# Patient Record
Sex: FEMALE | Race: WHITE | Employment: OTHER | URBAN - METROPOLITAN AREA
[De-identification: names, ages, dates, MRNs, and addresses within clinical notes are randomized per-mention and may not be internally consistent; named-entity substitution may affect disease eponyms.]

---

## 2018-03-21 ENCOUNTER — HOSPITAL ENCOUNTER (OUTPATIENT)
Dept: GENERAL RADIOLOGY | Age: 68
Discharge: HOME | End: 2018-03-23
Payer: MEDICARE

## 2018-03-21 ENCOUNTER — HOSPITAL ENCOUNTER (OUTPATIENT)
Age: 68
End: 2018-03-21
Payer: MEDICARE

## 2018-03-21 DIAGNOSIS — M25.541 ARTHRALGIA OF METACARPOPHALANGEAL JOINT, RIGHT: ICD-10-CM

## 2018-03-21 PROCEDURE — 73130 X-RAY EXAM OF HAND: CPT

## 2018-06-12 ENCOUNTER — HOSPITAL ENCOUNTER (OUTPATIENT)
Dept: GENERAL RADIOLOGY | Age: 68
Discharge: HOME | End: 2018-06-14
Payer: COMMERCIAL

## 2018-06-12 ENCOUNTER — HOSPITAL ENCOUNTER (OUTPATIENT)
Age: 68
Discharge: HOME | End: 2018-06-14
Payer: COMMERCIAL

## 2018-06-12 DIAGNOSIS — S33.9XXA SPRAIN OF LIGAMENT OF LUMBOSACRAL JOINT, INITIAL ENCOUNTER: ICD-10-CM

## 2018-06-12 DIAGNOSIS — S13.4XXA SPRAIN OF LIGAMENTS OF CERVICAL SPINE, INITIAL ENCOUNTER: ICD-10-CM

## 2018-06-12 PROCEDURE — 72110 X-RAY EXAM L-2 SPINE 4/>VWS: CPT

## 2018-06-12 PROCEDURE — 72050 X-RAY EXAM NECK SPINE 4/5VWS: CPT

## 2018-08-15 ENCOUNTER — APPOINTMENT (OUTPATIENT)
Dept: GENERAL RADIOLOGY | Age: 68
End: 2018-08-15
Payer: MEDICARE

## 2018-08-15 ENCOUNTER — HOSPITAL ENCOUNTER (EMERGENCY)
Age: 68
Discharge: HOME | End: 2018-08-15
Payer: MEDICARE

## 2018-08-15 VITALS
HEART RATE: 90 BPM | TEMPERATURE: 98 F | OXYGEN SATURATION: 96 % | SYSTOLIC BLOOD PRESSURE: 113 MMHG | DIASTOLIC BLOOD PRESSURE: 62 MMHG | RESPIRATION RATE: 16 BRPM | BODY MASS INDEX: 25.4 KG/M2 | HEIGHT: 62 IN | WEIGHT: 138 LBS

## 2018-08-15 DIAGNOSIS — S92.421B OPEN DISPLACED FRACTURE OF DISTAL PHALANX OF RIGHT GREAT TOE, INITIAL ENCOUNTER: Primary | ICD-10-CM

## 2018-08-15 PROCEDURE — 6370000000 HC RX 637 (ALT 250 FOR IP): Performed by: PHYSICIAN ASSISTANT

## 2018-08-15 PROCEDURE — 90715 TDAP VACCINE 7 YRS/> IM: CPT | Performed by: PHYSICIAN ASSISTANT

## 2018-08-15 PROCEDURE — 90471 IMMUNIZATION ADMIN: CPT | Performed by: PHYSICIAN ASSISTANT

## 2018-08-15 PROCEDURE — 96374 THER/PROPH/DIAG INJ IV PUSH: CPT

## 2018-08-15 PROCEDURE — 12002 RPR S/N/AX/GEN/TRNK2.6-7.5CM: CPT

## 2018-08-15 PROCEDURE — 2500000003 HC RX 250 WO HCPCS

## 2018-08-15 PROCEDURE — 6360000002 HC RX W HCPCS: Performed by: PHYSICIAN ASSISTANT

## 2018-08-15 PROCEDURE — 73660 X-RAY EXAM OF TOE(S): CPT

## 2018-08-15 PROCEDURE — 2580000003 HC RX 258: Performed by: PHYSICIAN ASSISTANT

## 2018-08-15 PROCEDURE — 99283 EMERGENCY DEPT VISIT LOW MDM: CPT

## 2018-08-15 RX ORDER — HYDROCODONE BITARTRATE AND ACETAMINOPHEN 5; 325 MG/1; MG/1
1 TABLET ORAL EVERY 6 HOURS PRN
Qty: 12 TABLET | Refills: 0 | Status: SHIPPED | OUTPATIENT
Start: 2018-08-15 | End: 2018-08-18

## 2018-08-15 RX ORDER — LEVOTHYROXINE SODIUM 0.12 MG/1
125 TABLET ORAL DAILY
Status: ON HOLD | COMMUNITY
End: 2021-07-20 | Stop reason: SDUPTHER

## 2018-08-15 RX ORDER — HYDROCODONE BITARTRATE AND ACETAMINOPHEN 5; 325 MG/1; MG/1
1 TABLET ORAL ONCE
Status: COMPLETED | OUTPATIENT
Start: 2018-08-15 | End: 2018-08-15

## 2018-08-15 RX ORDER — CEPHALEXIN 500 MG/1
500 CAPSULE ORAL 3 TIMES DAILY
Qty: 15 CAPSULE | Refills: 0 | Status: SHIPPED | OUTPATIENT
Start: 2018-08-15 | End: 2018-08-20

## 2018-08-15 RX ORDER — MINOXIDIL 10 MG/1
10 TABLET ORAL DAILY
Status: ON HOLD | COMMUNITY
End: 2021-07-20 | Stop reason: HOSPADM

## 2018-08-15 RX ORDER — DIAPER,BRIEF,INFANT-TODD,DISP
EACH MISCELLANEOUS ONCE
Status: COMPLETED | OUTPATIENT
Start: 2018-08-15 | End: 2018-08-15

## 2018-08-15 RX ORDER — LIDOCAINE HYDROCHLORIDE 10 MG/ML
INJECTION, SOLUTION INFILTRATION; PERINEURAL
Status: COMPLETED
Start: 2018-08-15 | End: 2018-08-15

## 2018-08-15 RX ADMIN — CEFAZOLIN 2 G: 1 INJECTION, POWDER, FOR SOLUTION INTRAMUSCULAR; INTRAVENOUS at 14:00

## 2018-08-15 RX ADMIN — TETANUS TOXOID, REDUCED DIPHTHERIA TOXOID AND ACELLULAR PERTUSSIS VACCINE, ADSORBED 0.5 ML: 5; 2.5; 8; 8; 2.5 SUSPENSION INTRAMUSCULAR at 12:55

## 2018-08-15 RX ADMIN — BACITRACIN: 500 OINTMENT TOPICAL at 12:55

## 2018-08-15 RX ADMIN — LIDOCAINE HYDROCHLORIDE 200 MG: 10 INJECTION, SOLUTION INFILTRATION; PERINEURAL at 12:18

## 2018-08-15 RX ADMIN — HYDROCODONE BITARTRATE AND ACETAMINOPHEN 1 TABLET: 5; 325 TABLET ORAL at 12:54

## 2018-08-15 ASSESSMENT — PAIN SCALES - GENERAL
PAINLEVEL_OUTOF10: 4
PAINLEVEL_OUTOF10: 10
PAINLEVEL_OUTOF10: 5
PAINLEVEL_OUTOF10: 10

## 2018-08-15 ASSESSMENT — PAIN DESCRIPTION - ORIENTATION: ORIENTATION: RIGHT

## 2018-08-15 ASSESSMENT — PAIN DESCRIPTION - LOCATION: LOCATION: TOE (COMMENT WHICH ONE)

## 2018-08-15 ASSESSMENT — PAIN DESCRIPTION - DESCRIPTORS: DESCRIPTORS: ACHING;THROBBING

## 2018-08-15 ASSESSMENT — PAIN DESCRIPTION - FREQUENCY: FREQUENCY: CONTINUOUS

## 2018-08-15 ASSESSMENT — PAIN DESCRIPTION - PAIN TYPE: TYPE: ACUTE PAIN

## 2018-08-15 NOTE — ED NOTES
Reviewed discharge instructions with patient, discussed medications and addressed all patient and family questions/concerns. Pt verbalizes understanding.      Jovana Cole RN  08/15/18 [id number]

## 2018-08-15 NOTE — ED PROVIDER NOTES
[facility]       Department of Emergency Medicine   ED  Provider Note  Admit Date/RoomTime: 8/15/2018 12:01 PM  ED Room: [facility]  Chief Complaint   Toe Injury (reports dropping generator on right foot, right first toe injury. Injury occurred at 1100am. Laceration x2 to left great toe. Actively bleeding on arrival)    History of Present Illness   Source of history provided by:  patient. History/Exam Limitations: none. Cindy Medina is a 75 y.o. old female presenting to the emergency department by private vehicle, for right, great toe pain which occured 1.5 hour(s) prior to arrival. Cause of complaint: the patient dropped a generator on her foot. Since onset the symptoms have been severe in degree. Her pain is aggraveated by movement, use and palpation and relieved by nothing. Tetanus Status: unknown. The patient dropped a generator on the right great toe, sustaining a large macerated laceration extending into the nail bed and also a smaller laceration with associated severe tenderness to palpation. She also sustained a small abrasion to the anterior aspect of the ankle without associated pain. Patient denies other pain or injury. She denies pain to the foot or other toes. She has intact sensation to the toe. ROS    Pertinent positives and negatives are stated within HPI, all other systems reviewed and are negative. History reviewed. No pertinent surgical history. Social History:  reports that she has never smoked. She has never used smokeless tobacco. She reports that she does not drink alcohol or use drugs. Family History: family history is not on file. Allergies: Patient has no known allergies. Physical Exam            ED Triage Vitals [08/15/18 1211]   BP Temp Temp src Pulse Resp SpO2 Height Weight   113/62 98 °F (36.7 °C) -- 90 16 96 % 5' 2\" (1.575 m) 138 lb (62.6 kg)       Oxygen Saturation Interpretation: Normal.    Constitutional:  Alert, development consistent with age.   Neck: Normal ROM. Toe(s):   right, great toe. Tenderness: severe. Swelling: Mild. Deformity: no.              ROM: diminished range of motion due to pain/injury. Skin: Dorsal aspect:  2.5 cm macerated, gaping, irregular laceration extending into the subcutaneous tissue and to the nail matrix with active bleeding. Also a 1 cm linear laceration with active bleeding noted to the IP joint. Both lacerations without foreign bodies or obvious tendon injuries  Neurovascular: Motor deficit: none. Sensory deficit: none. Pulse deficit: none. Capillary refill: normal.  Foot:             Tenderness:  none. Swelling: None. Deformity: no.            ROM: full range of motion. Skin:  no erythema, rash or wounds noted. 2+ dorsalis pedis pulse. Right ankle is without any tenderness to palpation. There is a superficial abrasion noted anteriorly. Lymphatics: No lymphangitis or adenopathy noted. Neurological:  Oriented. Motor functions intact. .    Lab / Imaging Results   (All laboratory and radiology results have been personally reviewed by myself)  Labs:  No results found for this visit on 08/15/18. Imaging: All Radiology results interpreted by Radiologist unless otherwise noted. XR TOE RIGHT (MIN 2 VIEWS)   Final Result   Comminuted distal phalangeal fracture of the right great toe with   relative alignment of fracture fragments. Moderately advanced degenerative changes of the first MTP joint is   incidentally noted.         ED Course / Medical Decision Making     Medications   lidocaine 1 % injection (200 mg  Given 8/15/18 1218)   HYDROcodone-acetaminophen (NORCO) 5-325 MG per tablet 1 tablet (1 tablet Oral Given 8/15/18 1254)   Tetanus-Diphth-Acell Pertussis (BOOSTRIX) injection 0.5 mL (0.5 mLs Intramuscular Given 8/15/18 1255)   bacitracin zinc ointment ( Topical Given 8/15/18 1255) ceFAZolin (ANCEF) 2 g in sterile water 20 mL IV syringe (2 g Intravenous Given 8/15/18 1400)        Re-examination:  8/15/18       Time: 1418   Patient in good condition and ready for discharge. Patient and her family want to follow up with Dr Alyssia Muse. He was clearly advised to watch for signs of infection including redness, fever, swelling, increased pain, chills, or purulent drainage and return if noted    Consult(s):   None    Procedure(s):     LACERATION REPAIR  PROCEDURE NOTE:  Unless otherwise indicated, this procedure was done or directly supervised by the ED attending. Laceration #: 1. Location: distal laceration right great toe dorsally  Length: 2.5 cm. The wound area was cleansend with shur-clens and draped in a sterile fashion. The wound area was anesthetized USING A DIGITAL BLOCK with Lidocaine 1% without epinephrine. WOUND COMPLEXITY:    Debridement: subcutaneous. Undermining: None. Wound Margins Revised: yes. Flaps Aligned: yes. The wound was explored with the following results no foreign body or tendon injury seen. Laceration extending into the nail matrix and subcutaneous tissue medially. The wound was closed with 4-0 Ethilon using interrupted sutures. Dressing:  bacitracin and a bandage was placed. Total number suture: 4      LACERATION REPAIR  PROCEDURE NOTE:  Unless otherwise indicated, this procedure was done or directly supervised by the ED attending. Laceration #: 2. Location: proximal right great toe dorsally overlying the IP joint  Length: 1 cm. The wound area was cleansend with shur-clens and draped in a sterile fashion. The wound area was anesthetized USING A DIGITAL BLOCK with Lidocaine 1% without epinephrine. WOUND COMPLEXITY:    Debridement: None. Undermining: None. Wound Margins Revised: None. Flaps Aligned: No.  The wound was explored with the following results no foreign body or tendon injury seen.   The wound was closed with 4-0 Ethilon using interrupted sutures. Dressing:  bacitracin and a bandage was placed. Total number suture: 2      MDM: This patient was clearly advised to watch for signs for infection including redness, fever, swelling, increased pain, chills, or purulent drainage and present to the emergency department immediately if noted. Due to open fracture, she was placed on antibiotics. She must follow-up with orthopaedics. Patient was discussed with Dr Luz Maria Jones prior to her departure from the ER. ER pharmacist [facility] consulted regarding antibiotic  choice   Films were obtained and are positive for fracture. Plan is subsequently for symptom control, limited weight-bearing and  immobilization with appropriate outpatient follow-up. Counseling: The emergency provider has spoken with the patient and discussed todays results, in addition to providing specific details for the plan of care and counseling regarding the diagnosis and prognosis. Questions are answered at this time and they are agreeable with the plan. Assessment      1. Open displaced fracture of distal phalanx of right great toe, initial encounter      Plan   Discharge to home  Patient condition is good    New Medications     New Prescriptions    CEPHALEXIN (KEFLEX) 500 MG CAPSULE    Take 1 capsule by mouth 3 times daily for 5 days    HYDROCODONE-ACETAMINOPHEN (NORCO) 5-325 MG PER TABLET    Take 1 tablet by mouth every 6 hours as needed for Pain for up to 3 days. Do not take working, driving or drinking alcohol. May cause drowsiness or constipation. Electronically signed by KORY Cueto   DD: 8/15/18  **This report was transcribed using voice recognition software. Every effort was made to ensure accuracy; however, inadvertent computerized transcription errors may be present.   END OF ED PROVIDER NOTE       Saul Cueto  08/15/18 [id number]

## 2018-08-20 ENCOUNTER — HOSPITAL ENCOUNTER (OUTPATIENT)
Dept: MRI IMAGING | Age: 68
Discharge: HOME | End: 2018-08-22
Payer: MEDICARE

## 2018-08-20 DIAGNOSIS — S13.4XXA SPRAIN OF LIGAMENTS OF CERVICAL SPINE, INITIAL ENCOUNTER: ICD-10-CM

## 2018-08-20 PROCEDURE — 72141 MRI NECK SPINE W/O DYE: CPT

## 2018-08-28 ENCOUNTER — HOSPITAL ENCOUNTER (OUTPATIENT)
Age: 68
Discharge: HOME | End: 2018-08-30
Payer: MEDICARE

## 2018-08-28 ENCOUNTER — HOSPITAL ENCOUNTER (OUTPATIENT)
Dept: GENERAL RADIOLOGY | Age: 68
Discharge: HOME | End: 2018-08-30
Payer: MEDICARE

## 2018-08-28 DIAGNOSIS — S92.421S CLOSED DISPLACED FRACTURE OF DISTAL PHALANX OF RIGHT GREAT TOE, SEQUELA: ICD-10-CM

## 2018-08-28 DIAGNOSIS — S97.111S: ICD-10-CM

## 2018-08-28 PROCEDURE — 73630 X-RAY EXAM OF FOOT: CPT

## 2018-09-11 ENCOUNTER — HOSPITAL ENCOUNTER (OUTPATIENT)
Dept: GENERAL RADIOLOGY | Age: 68
Discharge: HOME | End: 2018-09-13
Payer: MEDICARE

## 2018-09-11 ENCOUNTER — HOSPITAL ENCOUNTER (OUTPATIENT)
Age: 68
Discharge: HOME | End: 2018-09-13
Payer: MEDICARE

## 2018-09-11 DIAGNOSIS — S97.111A CRUSHING INJURY OF RIGHT GREAT TOE, INITIAL ENCOUNTER: ICD-10-CM

## 2018-09-11 DIAGNOSIS — S90.411A ABRASION OF RIGHT GREAT TOE, INITIAL ENCOUNTER: ICD-10-CM

## 2018-09-11 DIAGNOSIS — S92.421A: ICD-10-CM

## 2018-09-11 PROCEDURE — 73630 X-RAY EXAM OF FOOT: CPT

## 2018-10-02 ENCOUNTER — HOSPITAL ENCOUNTER (OUTPATIENT)
Age: 68
Discharge: HOME | End: 2018-10-04
Payer: MEDICARE

## 2018-10-02 ENCOUNTER — HOSPITAL ENCOUNTER (OUTPATIENT)
Dept: GENERAL RADIOLOGY | Age: 68
Discharge: HOME | End: 2018-10-04
Payer: MEDICARE

## 2018-10-02 DIAGNOSIS — S92.421A CLOSED DISPLACED FRACTURE OF DISTAL PHALANX OF RIGHT GREAT TOE, INITIAL ENCOUNTER: ICD-10-CM

## 2018-10-02 PROCEDURE — 73630 X-RAY EXAM OF FOOT: CPT

## 2018-10-19 ENCOUNTER — HOSPITAL ENCOUNTER (EMERGENCY)
Age: 68
Discharge: HOME | End: 2018-10-19
Payer: MEDICARE

## 2018-10-19 ENCOUNTER — APPOINTMENT (OUTPATIENT)
Dept: ULTRASOUND IMAGING | Age: 68
End: 2018-10-19
Payer: MEDICARE

## 2018-10-19 ENCOUNTER — APPOINTMENT (OUTPATIENT)
Dept: GENERAL RADIOLOGY | Age: 68
End: 2018-10-19
Payer: MEDICARE

## 2018-10-19 VITALS
SYSTOLIC BLOOD PRESSURE: 119 MMHG | TEMPERATURE: 98.5 F | WEIGHT: 140 LBS | HEIGHT: 62 IN | RESPIRATION RATE: 14 BRPM | OXYGEN SATURATION: 98 % | HEART RATE: 97 BPM | DIASTOLIC BLOOD PRESSURE: 76 MMHG | BODY MASS INDEX: 25.76 KG/M2

## 2018-10-19 DIAGNOSIS — M47.26 OSTEOARTHRITIS OF SPINE WITH RADICULOPATHY, LUMBAR REGION: ICD-10-CM

## 2018-10-19 DIAGNOSIS — M79.605 LEFT LEG PAIN: Primary | ICD-10-CM

## 2018-10-19 DIAGNOSIS — M25.552 LEFT HIP PAIN: ICD-10-CM

## 2018-10-19 PROCEDURE — 99284 EMERGENCY DEPT VISIT MOD MDM: CPT

## 2018-10-19 PROCEDURE — 93971 EXTREMITY STUDY: CPT

## 2018-10-19 PROCEDURE — 72110 X-RAY EXAM L-2 SPINE 4/>VWS: CPT

## 2018-10-19 PROCEDURE — 6370000000 HC RX 637 (ALT 250 FOR IP): Performed by: PHYSICIAN ASSISTANT

## 2018-10-19 PROCEDURE — 73502 X-RAY EXAM HIP UNI 2-3 VIEWS: CPT

## 2018-10-19 RX ORDER — OXYCODONE HYDROCHLORIDE AND ACETAMINOPHEN 5; 325 MG/1; MG/1
1 TABLET ORAL EVERY 6 HOURS PRN
Qty: 10 TABLET | Refills: 0 | Status: SHIPPED | OUTPATIENT
Start: 2018-10-19 | End: 2018-10-22

## 2018-10-19 RX ORDER — NAPROXEN 500 MG/1
500 TABLET ORAL 2 TIMES DAILY
Qty: 14 TABLET | Refills: 0 | Status: SHIPPED | OUTPATIENT
Start: 2018-10-19 | End: 2021-07-19

## 2018-10-19 RX ORDER — HYDROCODONE BITARTRATE AND ACETAMINOPHEN 5; 325 MG/1; MG/1
1 TABLET ORAL ONCE
Status: COMPLETED | OUTPATIENT
Start: 2018-10-19 | End: 2018-10-19

## 2018-10-19 RX ADMIN — HYDROCODONE BITARTRATE AND ACETAMINOPHEN 1 TABLET: 5; 325 TABLET ORAL at 10:07

## 2018-10-19 ASSESSMENT — PAIN SCALES - GENERAL
PAINLEVEL_OUTOF10: 8
PAINLEVEL_OUTOF10: 8

## 2018-10-19 ASSESSMENT — PAIN DESCRIPTION - LOCATION: LOCATION: LEG

## 2018-10-19 ASSESSMENT — PAIN DESCRIPTION - ORIENTATION: ORIENTATION: RIGHT

## 2018-10-19 ASSESSMENT — PAIN DESCRIPTION - PAIN TYPE: TYPE: ACUTE PAIN

## 2018-10-19 ASSESSMENT — PAIN DESCRIPTION - FREQUENCY: FREQUENCY: CONTINUOUS

## 2018-10-19 ASSESSMENT — PAIN DESCRIPTION - PROGRESSION: CLINICAL_PROGRESSION: NOT CHANGED

## 2018-10-19 NOTE — ED PROVIDER NOTES
Result   Mild degenerative changes      US DUP LOWER EXTREMITY LEFT SANTOSH   Final Result   Patent deep venous system of the left lower extremity. No evidence for DVT.          ------------------------- NURSING NOTES AND VITALS REVIEWED ---------------------------   The nursing notes within the ED encounter and vital signs as below have been reviewed. /76   Pulse 97   Temp 98.5 °F (36.9 °C) (Oral)   Resp 14   Ht 5' 2\" (1.575 m)   Wt 140 lb (63.5 kg)   SpO2 98%   BMI 25.61 kg/m²   Oxygen Saturation Interpretation: Normal      ---------------------------------------------------PHYSICAL EXAM--------------------------------------      Constitutional/General: Alert and oriented x3, well appearing, non toxic in NAD  Head: NC/AT  Eyes: PERRL, EOMI  Mouth: Oropharynx clear, handling secretions, no trismus  Neck: Supple, full ROM, no meningeal signs  Pulmonary: Lungs clear to auscultation bilaterally, no wheezes, rales, or rhonchi. Not in respiratory distress  Cardiovascular:  Regular rate and rhythm, no murmurs, gallops, or rubs. 2+ distal pulses  Abdomen: Soft, non tender, non distended,   Extremities: Moves all extremities x 4. Warm and well perfused. Full range of motion of bilateral lower extremities. Negative calf tenderness bilaterally. Pedal pulses are +2 bilaterally. Positive tenderness over the left hip. The left hip does not appear appeared erythematous or warm. Negative tenderness over sciatic area bilaterally.  + Straight leg raise on the right. Negative straight leg raise on the left. Back: negative cervical, thoracic or lumbar vertebral tenderness. There is no tenderness over the sciatic nerve.   Skin: warm and dry without rash  Neurologic: GCS 15,  Psych: Normal Affect      ------------------------------ ED COURSE/MEDICAL DECISION MAKING----------------------  Medications   HYDROcodone-acetaminophen (NORCO) 5-325 MG per tablet 1 tablet (1 tablet Oral Given 10/19/18 1007)         Medical Decision Making:    Patient seen independently. Patient denies injury. Results reviewed with patient. She did not have a lot of relief with Norco.  She states her pain is worse in her hip after having x-rays completed. Patient has an appointment with her doctor on November 6th. Pain is starting in her hip and radiating down her leg. Warning signs discussed with the patient. She should return for any worsening symptoms. We'll give her anti-inflammatories and something for pain. Patient to rest and elevate extremity. Counseling: The emergency provider has spoken with the patient and discussed todays results, in addition to providing specific details for the plan of care and counseling regarding the diagnosis and prognosis. Questions are answered at this time and they are agreeable with the plan.      --------------------------------- IMPRESSION AND DISPOSITION ---------------------------------    IMPRESSION  1. Left leg pain    2.  Left hip pain        DISPOSITION  Disposition: Discharge to home  Patient condition is stable                 Christina Bolton, [place]  10/19/18 [address]  10/19/18 1150

## 2019-04-30 ENCOUNTER — HOSPITAL ENCOUNTER (OUTPATIENT)
Age: 69
Discharge: HOME | End: 2019-05-02
Payer: MEDICARE

## 2019-04-30 ENCOUNTER — HOSPITAL ENCOUNTER (OUTPATIENT)
Dept: GENERAL RADIOLOGY | Age: 69
Discharge: HOME | End: 2019-05-02
Payer: MEDICARE

## 2019-04-30 DIAGNOSIS — S33.9XXA SPRAIN OF LIGAMENT OF LUMBOSACRAL JOINT, INITIAL ENCOUNTER: ICD-10-CM

## 2019-04-30 PROCEDURE — 72110 X-RAY EXAM L-2 SPINE 4/>VWS: CPT

## 2019-11-08 ENCOUNTER — HOSPITAL ENCOUNTER (OUTPATIENT)
Dept: MRI IMAGING | Age: 69
Discharge: HOME | End: 2019-11-10
Payer: MEDICARE

## 2019-11-08 DIAGNOSIS — M60.812 OTHER MYOSITIS, LEFT SHOULDER: ICD-10-CM

## 2019-11-08 DIAGNOSIS — M60.811 OTHER MYOSITIS OF RIGHT SHOULDER: ICD-10-CM

## 2019-11-08 DIAGNOSIS — M50.020 CERVICAL DISC DISORDER WITH MYELOPATHY OF MID-CERVICAL REGION: ICD-10-CM

## 2019-11-08 PROCEDURE — 72141 MRI NECK SPINE W/O DYE: CPT

## 2021-07-19 ENCOUNTER — HOSPITAL ENCOUNTER (INPATIENT)
Age: 71
LOS: 1 days | Discharge: HOME | DRG: 309 | End: 2021-07-20
Attending: EMERGENCY MEDICINE | Admitting: INTERNAL MEDICINE
Payer: MEDICARE

## 2021-07-19 ENCOUNTER — APPOINTMENT (OUTPATIENT)
Dept: GENERAL RADIOLOGY | Age: 71
DRG: 309 | End: 2021-07-19
Payer: MEDICARE

## 2021-07-19 DIAGNOSIS — I48.91 ATRIAL FIBRILLATION WITH RVR (HCC): Primary | ICD-10-CM

## 2021-07-19 PROBLEM — I47.1 ECTOPIC ATRIAL TACHYCARDIA (HCC): Status: ACTIVE | Noted: 2021-07-19

## 2021-07-19 PROBLEM — I47.19 ECTOPIC ATRIAL TACHYCARDIA: Status: ACTIVE | Noted: 2021-07-19

## 2021-07-19 LAB
ANION GAP SERPL CALCULATED.3IONS-SCNC: 8 MMOL/L (ref 7–16)
BASOPHILS ABSOLUTE: 0.05 E9/L (ref 0–0.2)
BASOPHILS RELATIVE PERCENT: 0.7 % (ref 0–2)
BUN BLDV-MCNC: 21 MG/DL (ref 6–23)
CALCIUM SERPL-MCNC: 8.6 MG/DL (ref 8.6–10.2)
CHLORIDE BLD-SCNC: 111 MMOL/L (ref 98–107)
CO2: 23 MMOL/L (ref 22–29)
CREAT SERPL-MCNC: 0.7 MG/DL (ref 0.5–1)
EOSINOPHILS ABSOLUTE: 0.11 E9/L (ref 0.05–0.5)
EOSINOPHILS RELATIVE PERCENT: 1.6 % (ref 0–6)
GFR AFRICAN AMERICAN: >60
GFR NON-AFRICAN AMERICAN: >60 ML/MIN/1.73
GLUCOSE BLD-MCNC: 100 MG/DL (ref 74–99)
HCT VFR BLD CALC: 43.2 % (ref 34–48)
HEMOGLOBIN: 14.4 G/DL (ref 11.5–15.5)
IMMATURE GRANULOCYTES #: 0.02 E9/L
IMMATURE GRANULOCYTES %: 0.3 % (ref 0–5)
LYMPHOCYTES ABSOLUTE: 2.31 E9/L (ref 1.5–4)
LYMPHOCYTES RELATIVE PERCENT: 33.5 % (ref 20–42)
MAGNESIUM: 1.7 MG/DL (ref 1.6–2.6)
MCH RBC QN AUTO: 29.1 PG (ref 26–35)
MCHC RBC AUTO-ENTMCNC: 33.3 % (ref 32–34.5)
MCV RBC AUTO: 87.3 FL (ref 80–99.9)
MONOCYTES ABSOLUTE: 0.73 E9/L (ref 0.1–0.95)
MONOCYTES RELATIVE PERCENT: 10.6 % (ref 2–12)
NEUTROPHILS ABSOLUTE: 3.67 E9/L (ref 1.8–7.3)
NEUTROPHILS RELATIVE PERCENT: 53.3 % (ref 43–80)
PDW BLD-RTO: 12.8 FL (ref 11.5–15)
PLATELET # BLD: 227 E9/L (ref 130–450)
PMV BLD AUTO: 10.9 FL (ref 7–12)
POTASSIUM SERPL-SCNC: 3.9 MMOL/L (ref 3.5–5)
RBC # BLD: 4.95 E12/L (ref 3.5–5.5)
SODIUM BLD-SCNC: 142 MMOL/L (ref 132–146)
T3 FREE: 6.8 PG/ML (ref 2–4.4)
T4 FREE: 0.95 NG/DL (ref 0.93–1.7)
TROPONIN, HIGH SENSITIVITY: 8 NG/L (ref 0–9)
TSH SERPL DL<=0.05 MIU/L-ACNC: <0.01 UIU/ML (ref 0.27–4.2)
WBC # BLD: 6.9 E9/L (ref 4.5–11.5)

## 2021-07-19 PROCEDURE — 6370000000 HC RX 637 (ALT 250 FOR IP): Performed by: INTERNAL MEDICINE

## 2021-07-19 PROCEDURE — 85025 COMPLETE CBC W/AUTO DIFF WBC: CPT

## 2021-07-19 PROCEDURE — 84481 FREE ASSAY (FT-3): CPT

## 2021-07-19 PROCEDURE — 84484 ASSAY OF TROPONIN QUANT: CPT

## 2021-07-19 PROCEDURE — APPSS180 APP SPLIT SHARED TIME > 60 MINUTES: Performed by: NURSE PRACTITIONER

## 2021-07-19 PROCEDURE — 99223 1ST HOSP IP/OBS HIGH 75: CPT | Performed by: INTERNAL MEDICINE

## 2021-07-19 PROCEDURE — 2580000003 HC RX 258: Performed by: INTERNAL MEDICINE

## 2021-07-19 PROCEDURE — 96374 THER/PROPH/DIAG INJ IV PUSH: CPT

## 2021-07-19 PROCEDURE — 2060000000 HC ICU INTERMEDIATE R&B

## 2021-07-19 PROCEDURE — 2500000003 HC RX 250 WO HCPCS: Performed by: EMERGENCY MEDICINE

## 2021-07-19 PROCEDURE — 71045 X-RAY EXAM CHEST 1 VIEW: CPT

## 2021-07-19 PROCEDURE — 93005 ELECTROCARDIOGRAM TRACING: CPT | Performed by: NURSE PRACTITIONER

## 2021-07-19 PROCEDURE — 80048 BASIC METABOLIC PNL TOTAL CA: CPT

## 2021-07-19 PROCEDURE — 6370000000 HC RX 637 (ALT 250 FOR IP): Performed by: NURSE PRACTITIONER

## 2021-07-19 PROCEDURE — 96375 TX/PRO/DX INJ NEW DRUG ADDON: CPT

## 2021-07-19 PROCEDURE — 6360000002 HC RX W HCPCS: Performed by: EMERGENCY MEDICINE

## 2021-07-19 PROCEDURE — 93005 ELECTROCARDIOGRAM TRACING: CPT | Performed by: EMERGENCY MEDICINE

## 2021-07-19 PROCEDURE — 99284 EMERGENCY DEPT VISIT MOD MDM: CPT

## 2021-07-19 PROCEDURE — 84439 ASSAY OF FREE THYROXINE: CPT

## 2021-07-19 PROCEDURE — 2580000003 HC RX 258: Performed by: EMERGENCY MEDICINE

## 2021-07-19 PROCEDURE — 83735 ASSAY OF MAGNESIUM: CPT

## 2021-07-19 PROCEDURE — 84443 ASSAY THYROID STIM HORMONE: CPT

## 2021-07-19 RX ORDER — POTASSIUM CHLORIDE 20 MEQ/1
20 TABLET, EXTENDED RELEASE ORAL ONCE
Status: COMPLETED | OUTPATIENT
Start: 2021-07-19 | End: 2021-07-19

## 2021-07-19 RX ORDER — DIGOXIN 0.25 MG/ML
125 INJECTION INTRAMUSCULAR; INTRAVENOUS ONCE
Status: COMPLETED | OUTPATIENT
Start: 2021-07-19 | End: 2021-07-19

## 2021-07-19 RX ORDER — ONDANSETRON 4 MG/1
4 TABLET, ORALLY DISINTEGRATING ORAL EVERY 8 HOURS PRN
Status: DISCONTINUED | OUTPATIENT
Start: 2021-07-19 | End: 2021-07-20 | Stop reason: HOSPADM

## 2021-07-19 RX ORDER — IBUPROFEN 800 MG/1
800 TABLET ORAL EVERY 8 HOURS PRN
COMMUNITY
End: 2022-04-14 | Stop reason: ALTCHOICE

## 2021-07-19 RX ORDER — VENLAFAXINE HYDROCHLORIDE 150 MG/1
150 CAPSULE, EXTENDED RELEASE ORAL DAILY
Status: DISCONTINUED | OUTPATIENT
Start: 2021-07-20 | End: 2021-07-20 | Stop reason: HOSPADM

## 2021-07-19 RX ORDER — ALPRAZOLAM 1 MG/1
1 TABLET ORAL 2 TIMES DAILY PRN
COMMUNITY
End: 2022-06-22 | Stop reason: ALTCHOICE

## 2021-07-19 RX ORDER — TROSPIUM CHLORIDE 20 MG/1
20 TABLET, FILM COATED ORAL NIGHTLY
Status: DISCONTINUED | OUTPATIENT
Start: 2021-07-19 | End: 2021-07-20 | Stop reason: HOSPADM

## 2021-07-19 RX ORDER — VENLAFAXINE HYDROCHLORIDE 150 MG/1
150 CAPSULE, EXTENDED RELEASE ORAL DAILY
COMMUNITY

## 2021-07-19 RX ORDER — CITALOPRAM 20 MG/1
60 TABLET ORAL DAILY
Status: DISCONTINUED | OUTPATIENT
Start: 2021-07-19 | End: 2021-07-20 | Stop reason: HOSPADM

## 2021-07-19 RX ORDER — CITALOPRAM 40 MG/1
60 TABLET ORAL DAILY
COMMUNITY
End: 2021-07-19

## 2021-07-19 RX ORDER — HYDROCODONE BITARTRATE AND ACETAMINOPHEN 7.5; 325 MG/1; MG/1
1 TABLET ORAL EVERY 6 HOURS PRN
COMMUNITY
End: 2022-07-12 | Stop reason: ALTCHOICE

## 2021-07-19 RX ORDER — POLYETHYLENE GLYCOL 3350 17 G/17G
17 POWDER, FOR SOLUTION ORAL DAILY PRN
Status: DISCONTINUED | OUTPATIENT
Start: 2021-07-19 | End: 2021-07-20 | Stop reason: HOSPADM

## 2021-07-19 RX ORDER — SODIUM CHLORIDE 9 MG/ML
25 INJECTION, SOLUTION INTRAVENOUS PRN
Status: DISCONTINUED | OUTPATIENT
Start: 2021-07-19 | End: 2021-07-20 | Stop reason: HOSPADM

## 2021-07-19 RX ORDER — CITALOPRAM 20 MG/1
60 TABLET ORAL DAILY
Status: DISCONTINUED | OUTPATIENT
Start: 2021-07-19 | End: 2021-07-19 | Stop reason: SDUPTHER

## 2021-07-19 RX ORDER — ONDANSETRON 2 MG/ML
4 INJECTION INTRAMUSCULAR; INTRAVENOUS EVERY 6 HOURS PRN
Status: DISCONTINUED | OUTPATIENT
Start: 2021-07-19 | End: 2021-07-20 | Stop reason: HOSPADM

## 2021-07-19 RX ORDER — SODIUM CHLORIDE 0.9 % (FLUSH) 0.9 %
5-40 SYRINGE (ML) INJECTION EVERY 12 HOURS SCHEDULED
Status: DISCONTINUED | OUTPATIENT
Start: 2021-07-19 | End: 2021-07-20 | Stop reason: HOSPADM

## 2021-07-19 RX ORDER — SODIUM CHLORIDE 0.9 % (FLUSH) 0.9 %
10 SYRINGE (ML) INJECTION PRN
Status: DISCONTINUED | OUTPATIENT
Start: 2021-07-19 | End: 2021-07-20 | Stop reason: HOSPADM

## 2021-07-19 RX ORDER — ACETAMINOPHEN 650 MG/1
650 SUPPOSITORY RECTAL EVERY 6 HOURS PRN
Status: DISCONTINUED | OUTPATIENT
Start: 2021-07-19 | End: 2021-07-20 | Stop reason: HOSPADM

## 2021-07-19 RX ORDER — SODIUM CHLORIDE 9 MG/ML
INJECTION, SOLUTION INTRAVENOUS ONCE
Status: COMPLETED | OUTPATIENT
Start: 2021-07-19 | End: 2021-07-19

## 2021-07-19 RX ORDER — HYDROCODONE BITARTRATE AND ACETAMINOPHEN 7.5; 325 MG/1; MG/1
1 TABLET ORAL EVERY 6 HOURS PRN
Status: DISCONTINUED | OUTPATIENT
Start: 2021-07-19 | End: 2021-07-20 | Stop reason: HOSPADM

## 2021-07-19 RX ORDER — DILTIAZEM HYDROCHLORIDE 5 MG/ML
10 INJECTION INTRAVENOUS ONCE
Status: COMPLETED | OUTPATIENT
Start: 2021-07-19 | End: 2021-07-19

## 2021-07-19 RX ORDER — TOPIRAMATE 100 MG/1
100 TABLET, FILM COATED ORAL 2 TIMES DAILY
COMMUNITY
End: 2022-07-12 | Stop reason: ALTCHOICE

## 2021-07-19 RX ORDER — VENLAFAXINE HYDROCHLORIDE 150 MG/1
150 CAPSULE, EXTENDED RELEASE ORAL DAILY
Status: DISCONTINUED | OUTPATIENT
Start: 2021-07-20 | End: 2021-07-19 | Stop reason: SDUPTHER

## 2021-07-19 RX ORDER — ACETAMINOPHEN 325 MG/1
650 TABLET ORAL EVERY 6 HOURS PRN
Status: DISCONTINUED | OUTPATIENT
Start: 2021-07-19 | End: 2021-07-20 | Stop reason: HOSPADM

## 2021-07-19 RX ORDER — SODIUM CHLORIDE 0.9 % (FLUSH) 0.9 %
5-40 SYRINGE (ML) INJECTION PRN
Status: DISCONTINUED | OUTPATIENT
Start: 2021-07-19 | End: 2021-07-20 | Stop reason: HOSPADM

## 2021-07-19 RX ORDER — ALPRAZOLAM 1 MG/1
1 TABLET ORAL 2 TIMES DAILY PRN
Status: DISCONTINUED | OUTPATIENT
Start: 2021-07-19 | End: 2021-07-20 | Stop reason: HOSPADM

## 2021-07-19 RX ADMIN — SODIUM CHLORIDE, PRESERVATIVE FREE 10 ML: 5 INJECTION INTRAVENOUS at 20:45

## 2021-07-19 RX ADMIN — DILTIAZEM HYDROCHLORIDE 30 MG: 30 TABLET, FILM COATED ORAL at 23:11

## 2021-07-19 RX ADMIN — APIXABAN 5 MG: 5 TABLET, FILM COATED ORAL at 20:44

## 2021-07-19 RX ADMIN — SODIUM CHLORIDE: 9 INJECTION, SOLUTION INTRAVENOUS at 12:16

## 2021-07-19 RX ADMIN — DEXTROSE MONOHYDRATE 5 MG/HR: 50 INJECTION, SOLUTION INTRAVENOUS at 12:18

## 2021-07-19 RX ADMIN — DIGOXIN 125 MCG: 250 INJECTION, SOLUTION INTRAMUSCULAR; INTRAVENOUS; PARENTERAL at 12:33

## 2021-07-19 RX ADMIN — DILTIAZEM HYDROCHLORIDE 10 MG: 5 INJECTION, SOLUTION INTRAVENOUS at 12:02

## 2021-07-19 RX ADMIN — TROSPIUM CHLORIDE 20 MG: 20 TABLET, FILM COATED ORAL at 20:44

## 2021-07-19 RX ADMIN — DILTIAZEM HYDROCHLORIDE 30 MG: 30 TABLET, FILM COATED ORAL at 18:58

## 2021-07-19 RX ADMIN — POTASSIUM CHLORIDE 20 MEQ: 20 TABLET, EXTENDED RELEASE ORAL at 15:27

## 2021-07-19 ASSESSMENT — PAIN DESCRIPTION - LOCATION: LOCATION: CHEST

## 2021-07-19 ASSESSMENT — ENCOUNTER SYMPTOMS
EYE REDNESS: 0
DIARRHEA: 0
NAUSEA: 0
WHEEZING: 0
EYE DISCHARGE: 0
VOMITING: 0
SHORTNESS OF BREATH: 0
ABDOMINAL DISTENTION: 0
SORE THROAT: 0
SINUS PRESSURE: 0
EYE PAIN: 0
COUGH: 0
BACK PAIN: 0

## 2021-07-19 ASSESSMENT — PAIN SCALES - GENERAL
PAINLEVEL_OUTOF10: 0
PAINLEVEL_OUTOF10: 0
PAINLEVEL_OUTOF10: 3

## 2021-07-19 ASSESSMENT — PAIN DESCRIPTION - PAIN TYPE: TYPE: ACUTE PAIN

## 2021-07-19 NOTE — ED PROVIDER NOTES
76-year-old female presents to the emergency department from [address] [place], [place] [address]Th [facility] [[email or web address]]. The patient was have a procedure with a urologist today but a routine EKG was done showing she was in A. fib which patient has no history of. She states she had some palpitations but otherwise no complaints including leg swelling chest pain shortness of breath nausea vomiting dye abdominal pain urinary symptoms or nausea vomiting. The history is provided by the patient. Palpitations  Palpitations quality:  Fast  Onset quality:  Unable to specify  Timing:  Intermittent  Progression:  Waxing and waning  Chronicity:  New  Relieved by:  Nothing  Worsened by:  Nothing  Ineffective treatments:  None tried  Associated symptoms: no back pain, no chest pain, no chest pressure, no cough, no diaphoresis, no dizziness, no lower extremity edema, no nausea, no near-syncope, no numbness, no shortness of breath, no vomiting and no weakness    Risk factors: no diabetes mellitus, no heart disease, no hx of atrial fibrillation, no hyperthyroidism and no stress         Review of Systems   Constitutional: Negative for chills, diaphoresis and fever. HENT: Negative for ear pain, sinus pressure and sore throat. Eyes: Negative for pain, discharge and redness. Respiratory: Negative for cough, shortness of breath and wheezing. Cardiovascular: Positive for palpitations. Negative for chest pain and near-syncope. Gastrointestinal: Negative for abdominal distention, diarrhea, nausea and vomiting. Genitourinary: Negative for dysuria and frequency. Musculoskeletal: Negative for arthralgias and back pain. Skin: Negative for rash and wound. Neurological: Negative for dizziness, weakness, numbness and headaches. Hematological: Negative for adenopathy. All other systems reviewed and are negative. Physical Exam  Constitutional:       Appearance: Normal appearance. HENT:      Head: Normocephalic and atraumatic.       Nose: Nose normal.   Eyes:      Extraocular Movements: Extraocular movements intact. Pupils: Pupils are equal, round, and reactive to light. Cardiovascular:      Rate and Rhythm: Normal rate and regular rhythm. Pulses: Normal pulses. Heart sounds: Normal heart sounds. Abdominal:      General: Abdomen is flat. Bowel sounds are normal. There is no distension. Palpations: Abdomen is soft. Tenderness: There is no abdominal tenderness. There is no guarding. Musculoskeletal:         General: Normal range of motion. Right lower leg: No edema. Left lower leg: No edema. Skin:     General: Skin is warm. Capillary Refill: Capillary refill takes less than 2 seconds. Neurological:      General: No focal deficit present. Mental Status: She is alert and oriented to person, place, and time. Procedures         MDM  Number of Diagnoses or Management Options  Diagnosis management comments: Patient seen and examined. Labs and imaging were ordered. [facility]. Concern is for A. fib with RVR. Patient was given initial bolus of Cardizem which did make her a little bit hypotensive but then once started on drip had improvement of her rate to the mid 80s. Rest of work-up is otherwise unremarkable Case discussed with Dr. Feliciano Rangel who will admit the patient.             --------------------------------------------- PAST HISTORY ---------------------------------------------  Past Medical History:  has no past medical history on file. Past Surgical History:  has no past surgical history on file. Social History:  reports that she has never smoked. She has never used smokeless tobacco. She reports current alcohol use. She reports that she does not use drugs. Family History: family history is not on file. The patients home medications have been reviewed.     Allergies: Statins    -------------------------------------------------- RESULTS -------------------------------------------------    Lab  Results for orders placed or performed during the hospital encounter of 07/19/21   CBC Auto Differential   Result Value Ref Range    WBC 6.9 4.5 - 11.5 E9/L    RBC 4.95 3.50 - 5.50 E12/L    Hemoglobin 14.4 11.5 - 15.5 g/dL    Hematocrit 43.2 34.0 - 48.0 %    MCV 87.3 80.0 - 99.9 fL    MCH 29.1 26.0 - 35.0 pg    MCHC 33.3 32.0 - 34.5 %    RDW 12.8 11.5 - 15.0 fL    Platelets 921 858 - 729 E9/L    MPV 10.9 7.0 - 12.0 fL    Neutrophils % 53.3 43.0 - 80.0 %    Immature Granulocytes % 0.3 0.0 - 5.0 %    Lymphocytes % 33.5 20.0 - 42.0 %    Monocytes % 10.6 2.0 - 12.0 %    Eosinophils % 1.6 0.0 - 6.0 %    Basophils % 0.7 0.0 - 2.0 %    Neutrophils Absolute 3.67 1.80 - 7.30 E9/L    Immature Granulocytes # 0.02 E9/L    Lymphocytes Absolute 2.31 1.50 - 4.00 E9/L    Monocytes Absolute 0.73 0.10 - 0.95 E9/L    Eosinophils Absolute 0.11 0.05 - 0.50 E9/L    Basophils Absolute 0.05 0.00 - 0.20 D9/I   Basic Metabolic Panel   Result Value Ref Range    Sodium 142 132 - 146 mmol/L    Potassium 3.9 3.5 - 5.0 mmol/L    Chloride 111 (H) 98 - 107 mmol/L    CO2 23 22 - 29 mmol/L    Anion Gap 8 7 - 16 mmol/L    Glucose 100 (H) 74 - 99 mg/dL    BUN 21 6 - 23 mg/dL    CREATININE 0.7 0.5 - 1.0 mg/dL    GFR Non-African American >60 >=60 mL/min/1.73    GFR African American >60     Calcium 8.6 8.6 - 10.2 mg/dL   Troponin   Result Value Ref Range    Troponin, High Sensitivity 8 0 - 9 ng/L   TSH without Reflex   Result Value Ref Range    TSH <0.010 (L) 0.270 - 4.200 uIU/mL   T4, FREE   Result Value Ref Range    T4 Free 0.95 0.93 - 1.70 ng/dL   EKG 12 Lead   Result Value Ref Range    Ventricular Rate 119 BPM    Atrial Rate 153 BPM    QRS Duration 78 ms    Q-T Interval 320 ms    QTc Calculation (Bazett) 450 ms    P Axis 81 degrees    R Axis 117 degrees    T Axis 64 degrees       Radiology  No results found. EKG: This EKG is signed and interpreted by me.     Rate: 119  Rhythm: Atrial fibrillation vs sinus  Interpretation: atrial fibrillation (new onset)  Comparison: no previous EKG available      ------------------------- NURSING NOTES AND VITALS REVIEWED ---------------------------  Date / Time Roomed:  7/19/2021 10:38 AM  ED Bed Assignment:  18/18    The nursing notes within the ED encounter and vital signs as below have been reviewed. Patient Vitals for the past 24 hrs:   BP Temp Pulse Resp SpO2 Height Weight   07/19/21 1233 -- -- 132 -- -- -- --   07/19/21 1227 119/72 -- 156 18 99 % -- --   07/19/21 1209 (!) 74/54 -- 158 16 98 % -- --   07/19/21 1151 107/70 98.4 °F (36.9 °C) 159 16 99 % 5' 2\" (1.575 m) 115 lb (52.2 kg)       Oxygen Saturation Interpretation: Normal      ------------------------------------------ PROGRESS NOTES ------------------------------------------  I have spoken with the patient and discussed todays results, in addition to providing specific details for the plan of care and counseling regarding the diagnosis and prognosis. Their questions are answered at this time and they are agreeable with the plan.      --------------------------------- ADDITIONAL PROVIDER NOTES ---------------------------------  This patient's ED course included: a personal history and physicial examination, re-evaluation prior to disposition, multiple bedside re-evaluations, IV medications, cardiac monitoring, continuous pulse oximetry and complex medical decision making and emergency management    This patient has improved during their ED course. Please note that the withdrawal or failure to initiate urgent interventions for this patient would likely result in a life threatening deterioration or permanent disability. Accordingly this patient received 30 minutes of critical care time, excluding separately billable procedures. Clinical Impression  1. Atrial fibrillation with RVR ([address]          Disposition  Patient's disposition: Admit to telemetry  Patient's condition is fair. Rogers Miller,   07/19/21 1255
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
## 2021-07-19 NOTE — ED NOTES
Report faxed. Orestes at ext [phone] stated if they did not get it they will call us back.  Errol updated and prepared to be transported     Khris Urbina RN  07/19/21 7529

## 2021-07-19 NOTE — PROGRESS NOTES
Database complete. Medications reconciled. Care plans and education initiated. Urologist is Dr. Celi Trejo. Urvashi Bobo states she was to have a bladder lift and removal of hysterectomy mesh today at [facility].

## 2021-07-20 VITALS
DIASTOLIC BLOOD PRESSURE: 56 MMHG | WEIGHT: 121.9 LBS | RESPIRATION RATE: 16 BRPM | SYSTOLIC BLOOD PRESSURE: 129 MMHG | HEART RATE: 71 BPM | BODY MASS INDEX: 22.43 KG/M2 | HEIGHT: 62 IN | TEMPERATURE: 98.2 F | OXYGEN SATURATION: 98 %

## 2021-07-20 LAB
ANION GAP SERPL CALCULATED.3IONS-SCNC: 6 MMOL/L (ref 7–16)
BASOPHILS ABSOLUTE: 0.05 E9/L (ref 0–0.2)
BASOPHILS RELATIVE PERCENT: 0.8 % (ref 0–2)
BUN BLDV-MCNC: 16 MG/DL (ref 6–23)
CALCIUM SERPL-MCNC: 8.2 MG/DL (ref 8.6–10.2)
CHLORIDE BLD-SCNC: 112 MMOL/L (ref 98–107)
CO2: 23 MMOL/L (ref 22–29)
CREAT SERPL-MCNC: 0.6 MG/DL (ref 0.5–1)
EKG ATRIAL RATE: 153 BPM
EKG ATRIAL RATE: 79 BPM
EKG P AXIS: 72 DEGREES
EKG P AXIS: 81 DEGREES
EKG P-R INTERVAL: 154 MS
EKG Q-T INTERVAL: 320 MS
EKG Q-T INTERVAL: 394 MS
EKG QRS DURATION: 78 MS
EKG QRS DURATION: 82 MS
EKG QTC CALCULATION (BAZETT): 450 MS
EKG QTC CALCULATION (BAZETT): 451 MS
EKG R AXIS: 103 DEGREES
EKG R AXIS: 117 DEGREES
EKG T AXIS: 46 DEGREES
EKG T AXIS: 64 DEGREES
EKG VENTRICULAR RATE: 119 BPM
EKG VENTRICULAR RATE: 79 BPM
EOSINOPHILS ABSOLUTE: 0.16 E9/L (ref 0.05–0.5)
EOSINOPHILS RELATIVE PERCENT: 2.6 % (ref 0–6)
GFR AFRICAN AMERICAN: >60
GFR NON-AFRICAN AMERICAN: >60 ML/MIN/1.73
GLUCOSE BLD-MCNC: 107 MG/DL (ref 74–99)
HCT VFR BLD CALC: 36.9 % (ref 34–48)
HEMOGLOBIN: 12.3 G/DL (ref 11.5–15.5)
IMMATURE GRANULOCYTES #: 0.02 E9/L
IMMATURE GRANULOCYTES %: 0.3 % (ref 0–5)
LV EF: 68 %
LVEF MODALITY: NORMAL
LYMPHOCYTES ABSOLUTE: 2.48 E9/L (ref 1.5–4)
LYMPHOCYTES RELATIVE PERCENT: 41.1 % (ref 20–42)
MCH RBC QN AUTO: 29.4 PG (ref 26–35)
MCHC RBC AUTO-ENTMCNC: 33.3 % (ref 32–34.5)
MCV RBC AUTO: 88.3 FL (ref 80–99.9)
MONOCYTES ABSOLUTE: 0.69 E9/L (ref 0.1–0.95)
MONOCYTES RELATIVE PERCENT: 11.4 % (ref 2–12)
NEUTROPHILS ABSOLUTE: 2.64 E9/L (ref 1.8–7.3)
NEUTROPHILS RELATIVE PERCENT: 43.8 % (ref 43–80)
PDW BLD-RTO: 12.8 FL (ref 11.5–15)
PLATELET # BLD: 191 E9/L (ref 130–450)
PMV BLD AUTO: 10.8 FL (ref 7–12)
POTASSIUM REFLEX MAGNESIUM: 4 MMOL/L (ref 3.5–5)
RBC # BLD: 4.18 E12/L (ref 3.5–5.5)
SODIUM BLD-SCNC: 141 MMOL/L (ref 132–146)
WBC # BLD: 6 E9/L (ref 4.5–11.5)

## 2021-07-20 PROCEDURE — 93010 ELECTROCARDIOGRAM REPORT: CPT | Performed by: INTERNAL MEDICINE

## 2021-07-20 PROCEDURE — 6370000000 HC RX 637 (ALT 250 FOR IP): Performed by: INTERNAL MEDICINE

## 2021-07-20 PROCEDURE — 85025 COMPLETE CBC W/AUTO DIFF WBC: CPT

## 2021-07-20 PROCEDURE — 36415 COLL VENOUS BLD VENIPUNCTURE: CPT

## 2021-07-20 PROCEDURE — 2580000003 HC RX 258: Performed by: INTERNAL MEDICINE

## 2021-07-20 PROCEDURE — 80048 BASIC METABOLIC PNL TOTAL CA: CPT

## 2021-07-20 PROCEDURE — 93306 TTE W/DOPPLER COMPLETE: CPT

## 2021-07-20 PROCEDURE — 99233 SBSQ HOSP IP/OBS HIGH 50: CPT | Performed by: INTERNAL MEDICINE

## 2021-07-20 RX ORDER — LEVOTHYROXINE SODIUM 0.07 MG/1
75 TABLET ORAL DAILY
Qty: 30 TABLET | Refills: 1 | Status: SHIPPED | OUTPATIENT
Start: 2021-07-20 | End: 2022-07-12 | Stop reason: ALTCHOICE

## 2021-07-20 RX ORDER — DILTIAZEM HYDROCHLORIDE 120 MG/1
120 CAPSULE, COATED, EXTENDED RELEASE ORAL DAILY
Qty: 30 CAPSULE | Refills: 3 | Status: SHIPPED | OUTPATIENT
Start: 2021-07-21 | End: 2021-08-27 | Stop reason: DRUGHIGH

## 2021-07-20 RX ORDER — DILTIAZEM HYDROCHLORIDE 120 MG/1
120 CAPSULE, COATED, EXTENDED RELEASE ORAL DAILY
Status: DISCONTINUED | OUTPATIENT
Start: 2021-07-20 | End: 2021-07-20 | Stop reason: HOSPADM

## 2021-07-20 RX ADMIN — CITALOPRAM HYDROBROMIDE 60 MG: 20 TABLET ORAL at 09:33

## 2021-07-20 RX ADMIN — VENLAFAXINE HYDROCHLORIDE 150 MG: 150 CAPSULE, EXTENDED RELEASE ORAL at 11:19

## 2021-07-20 RX ADMIN — DILTIAZEM HYDROCHLORIDE 120 MG: 120 CAPSULE, COATED, EXTENDED RELEASE ORAL at 09:38

## 2021-07-20 RX ADMIN — DILTIAZEM HYDROCHLORIDE 30 MG: 30 TABLET, FILM COATED ORAL at 05:43

## 2021-07-20 RX ADMIN — SODIUM CHLORIDE, PRESERVATIVE FREE 10 ML: 5 INJECTION INTRAVENOUS at 09:34

## 2021-07-20 RX ADMIN — APIXABAN 5 MG: 5 TABLET, FILM COATED ORAL at 09:33

## 2021-07-20 ASSESSMENT — PAIN SCALES - GENERAL: PAINLEVEL_OUTOF10: 0

## 2021-07-20 NOTE — PLAN OF CARE
Problem: Pain:  Goal: Pain level will decrease  Description: Pain level will decrease  7/19/2021 2113 by Sierra Harrell RN  Outcome: Met This Shift  7/19/2021 1329 by Javid Saucedo RN  Outcome: Met This Shift  Goal: Control of acute pain  Description: Control of acute pain  7/19/2021 2113 by Sierra aHrrell RN  Outcome: Met This Shift  7/19/2021 1329 by Javid Saucedo RN  Outcome: Met This Shift  Goal: Control of chronic pain  Description: Control of chronic pain  7/19/2021 2113 by Sierra Harrell RN  Outcome: Met This Shift  7/19/2021 1329 by Javid Saucedo RN  Outcome: Met This Shift     Problem: Falls - Risk of:  Goal: Will remain free from falls  Description: Will remain free from falls  7/19/2021 2113 by Sierra Harrell RN  Outcome: Met This Shift  7/19/2021 1329 by Javid Saucedo RN  Outcome: Met This Shift  Goal: Absence of physical injury  Description: Absence of physical injury  7/19/2021 2113 by Sierra Harrell RN  Outcome: Met This Shift  7/19/2021 1329 by Javid Saucedo RN  Outcome: Met This Shift     Problem: FLUID AND ELECTROLYTE IMBALANCE  Goal: Fluid and electrolyte balance are achieved/maintained  7/19/2021 2113 by Sierra Harrell RN  Outcome: Ongoing  7/19/2021 1329 by Javid Saucedo RN  Outcome: Ongoing     Problem: FLUID AND ELECTROLYTE IMBALANCE  Goal: Fluid and electrolyte balance are achieved/maintained  7/19/2021 2113 by Sierra Harrell RN  Outcome: Ongoing  7/19/2021 1329 by Javid Saucedo RN  Outcome: Ongoing     Problem: Cardiac Output - Decreased:  Goal: Cardiac rhythm stable  7/19/2021 2113 by Sierra Harrell RN  Outcome: Ongoing  7/19/2021 1329 by Javid Saucedo RN  Outcome: Ongoing

## 2021-07-20 NOTE — PROGRESS NOTES
Occupational Therapy  Date:7/20/2021  Patient Name: Sandra Hudson  Room: 6182/4007-X     Occupational Therapy (OT) order received, patient's medical record reviewed, and OT evaluation attempted this morning; patient declined completion of OT evaluation noting that she is independent with ADLs and functional transfers/mobility. Patient stated that family/friends can assist with IADLs, as needed. No OT evaluation completed, per patient preference. Amanda Elizabeth, OTR/L  License Number: [id number]

## 2021-07-20 NOTE — PROGRESS NOTES
INPATIENT CARDIOLOGY FOLLOW-UP    Name: Radha De Leon    Age: 69 y.o. Date of Admission: 7/19/2021 10:38 AM    Date of Service: 7/20/2021    Chief Complaint: Follow-up for paroxysmal atrial fibrillation, hypertension, atypical chest pain    Interim History: The patient present remains compensated from a cardiovascular standpoint with no recurrent paroxysmal atrial arrhythmias. Additional assessment of her thyroid status demonstrates evidence of elevation of her free T3 levels. She is presently tolerating her existing medical regimen without difficulty and with acceptable control of her blood pressure. She continues to note ongoing continuous atypically described left precordial chest discomfort unchanged from that previously reported. Review of Systems: The remainder of a complete multisystem review including consitutional, central nervous, respiratory, circulatory, gastrointestinal, genitourinary, endocrinologic, hematologic, musculoskeletal and psychiatric are negative. Problem List:  Patient Active Problem List   Diagnosis    Ectopic atrial tachycardia (HCC)       Allergies:   Allergies   Allergen Reactions    Statins Other (See Comments)     Muscle aches       Current Medications:  Current Facility-Administered Medications   Medication Dose Route Frequency Provider Last Rate Last Admin    sodium chloride flush 0.9 % injection 10 mL  10 mL Intravenous PRN David Arciniega MD        perflutren lipid microspheres (DEFINITY) injection 1.65 mg  1.5 mL Intravenous ONCE PRN David Arciniega MD        ALPRAZolam Vicentaanderson Bail[place]) tablet 1 mg  1 mg Oral BID PRN David Arciniega MD        [facility]) tablet 20 mg  20 mg Oral Nightly David Arciniega MD   20 mg at 07/19/21 2044    HYDROcodone-acetaminophen (NORCO) 7.5-325 MG per tablet 1 tablet  1 tablet Oral Q6H PRN David Arciniega MD        sodium chloride flush 0.9 % injection 5-40 mL  5-40 mL Intravenous 2 times per day David Arciniega MD   10 mL at 07/19/21 2045    sodium chloride flush 0.9 % injection 5-40 mL  5-40 mL Intravenous PRN Deborah Nicholas MD        0.9 % sodium chloride infusion  25 mL Intravenous PRN Deborah Nicholas MD        ondansetron (ZOFRAN-ODT) disintegrating tablet 4 mg  4 mg Oral Q8H PRN Deborah Nicholas MD        Or    [facility]etron [facility]) injection 4 mg  4 mg Intravenous Q6H PRN Deborah Nicholas MD        polyethylene glycol (GLYCOLAX) packet 17 g  17 g Oral Daily PRN Deborah Nicholas MD        acetaminophen (TYLENOL) tablet 650 mg  650 mg Oral Q6H PRN Deborah Nicholas MD        Or    acetaminophen (TYLENOL) suppository 650 mg  650 mg Rectal Q6H PRN Deborah Nicholas MD        dilTIAZem (CARDIZEM) tablet 30 mg  30 mg Oral 4 times per day Linda Mares MD   30 mg at 07/20/21 0543    apixaban (ELIQUIS) tablet 5 mg  5 mg Oral BID Linda Mares MD   5 mg at 07/19/21 2044    citalopram (CELEXA) tablet 60 mg  60 mg Oral Daily Deborah Nicholas MD        venlafaxine (EFFEXOR XR) extended release capsule 150 mg  150 mg Oral Daily Deborah Nicholas MD          sodium chloride         Physical Exam:  BP (!) 113/43   Pulse 62   Temp 98.6 °F (37 °C) (Oral)   Resp 16   Ht 5' 2\" (1.575 m)   Wt 121 lb 14.4 oz (55.3 kg)   SpO2 100%   BMI 22.30 kg/m²   Weight change: Wt Readings from Last 3 Encounters:   07/20/21 121 lb 14.4 oz (55.3 kg)   05/27/21 118 lb 12.8 oz (53.9 kg)   10/19/18 140 lb (63.5 kg)     The patient is awake, alert and in no discomfort or distress. No gross musculoskeletal deformity is present. No significant skin or nail changes are present. Gross examination of head, eyes, nose and throat are negative. Jugular venous pressure is normal and no carotid bruits are present. Normal respiratory effort is noted with no accessory muscle usage present. Lung fields are clear to ascultation. Cardiac examination is notable for a regular rate and rhythm with no palpable thrill. No gallop rhythm or cardiac murmur are identified.  A benign abdominal long-acting preparation both for rate stabilization should additional atrial arrhythmias be noted as well as that of antihypertensive control with a discontinuation of her previously administered minoxidil. Presently has initially outlined based on her embolic risk oral anticoagulation has been initiated with plans of continuation at least on a short-term basis while further assessment of her thyroid status and the presence or absence of structural heart disease are evaluated and especially if recurrent arrhythmias are noted on a long-term basis based on her present embolic risk. Ongoing aggressive risk factor modification of blood pressure and serum lipids remain essential to reducing risk of future atherosclerotic development. Note: This report was completed utilizing computer voice recognition software. Every effort has been made to ensure accuracy, however; inadvertent computerized transcription errors may be present. Maia Benson.  Paula Moore, [address] [facility]

## 2021-07-20 NOTE — CARE COORDINATION
7/20/2021  Social Work Discharge Planning: Possible discharge. This worker met with Pt to discuss  role and transition of care/discharge planning. Pt is independent at home with spouse. Room air. Awaiting PT eval. Pt states no needs. New eliquis-SW gave Pt a 30 day eliquis card. [facility].   Electronically signed by TYE Lim on 7/20/2021 at 10:25 AM

## 2021-07-20 NOTE — PROGRESS NOTES
Physical Therapy    Facility/Department: [address] INTERNAL MEDICINE 2    NAME: Starleen Leyden  : 1950  MRN: [id number]     Order received and chart reviewed and discussed with the pt. Pt reported she is independent with all mobility around her room and does not need PT while in the hospital.  Will discontinue PT order.       Jesika Henderson, [address]

## 2021-07-20 NOTE — H&P
Svitlana Parson M.D. History and Physical      CHIEF COMPLAINT:  Chest pain and abnormal rhythm identified at [facility]    Reason for Admission:  Atrial fibrillation with RVR    History Obtained From:  patient, EMR    HISTORY OF PRESENT ILLNESS:      The patient is a 69 y.o. female of [place], [place] with significant past medical history of HTN who presents with known a fib RVR found yesterday when the patient presented to [facility] to have a bladder lift. She states that over the past 2 weeks, she has noticed some chest discomfort \"like a pulled muscle\" and occasional palpitations, but denies any other significant symptoms. She denies a history of similar previous episodes. She notices a decreased exercise tolerance, but states that it is chronic. Denies fever, chills, SOB, abdominal pain, bloody urine or stool, or lower extremity edema. Past Medical History:    History reviewed. No pertinent past medical history. Past Surgical History:    History reviewed. No pertinent surgical history. Medications Prior to Admission:    Medications Prior to Admission: ALPRAZolam (XANAX) 1 MG tablet, Take 1 mg by mouth 2 times daily as needed for Anxiety. HYDROcodone-acetaminophen (NORCO) 7.5-325 MG per tablet, Take 1 tablet by mouth every 6 hours as needed for Pain. ibuprofen (ADVIL;MOTRIN) 800 MG tablet, Take 800 mg by mouth every 8 hours as needed for Pain  venlafaxine (EFFEXOR XR) 150 MG extended release capsule, Take 150 mg by mouth daily  topiramate (TOPAMAX) 100 MG tablet, Take 100 mg by mouth 2 times daily  levothyroxine (SYNTHROID) 125 MCG tablet, Take 125 mcg by mouth Daily  minoxidil (LONITEN) 10 MG tablet, Take 10 mg by mouth daily    Allergies:  Statins    Social History:   TOBACCO:   reports that she has never smoked. She has never used smokeless tobacco.  ETOH:   reports previous alcohol use.   MARITAL STATUS:    OCCUPATION:      Family History: Problem Relation Age of Onset    Cancer Mother     Diabetes Father     Cancer Sister     No Known Problems Brother     No Known Problems Sister        REVIEW OF SYSTEMS:    General ROS: negative for fever or chills  Hematological and Lymphatic ROS: negative  Endocrine ROS: negative  Respiratory ROS: no cough, shortness of breath, or wheezing  Cardiovascular ROS: + chest discomfort and palpitations  Gastrointestinal ROS: no abdominal pain, change in bowel habits, or black or bloody stools  Genito-Urinary ROS: no dysuria, trouble voiding, or hematuria  Neurological ROS: no TIA or stroke symptoms  negative    Vitals:  /61   Pulse 62   Temp 97.8 °F (36.6 °C) (Oral)   Resp 16   Ht 5' 2\" (1.575 m)   Wt 121 lb 14.4 oz (55.3 kg)   SpO2 99%   BMI 22.30 kg/m²     PHYSICAL EXAM:  General:  Awake, alert, oriented X 3. Well developed, well nourished, well groomed. No apparent distress. HEENT:  Normocephalic, atraumatic. Pupils equal, round. .  No scleral icterus. No conjunctival injection. Normal lips, teeth, and gums. No nasal discharge. Neck:  Supple  Heart:  RRR, no murmurs, gallops, rubs, carotid upstroke normal, no carotid bruits  Lungs:  CTA bilaterally, bilat symmetrical expansion, no wheeze, rales, or rhonchi  Abdomen: Bowel sounds present, soft, nontender. Extremities:  No clubbing, cyanosis, or edema  Skin:  Warm and dry, no open lesions or rash  Neuro:  Cranial nerves grossly intact, no focal deficits  Breast: deferred  Rectal: deferred  Genitalia:  deferred      DATA:     Recent Labs     07/19/21  1116 07/20/21  0255   WBC 6.9 6.0   HGB 14.4 12.3    191     Recent Labs     07/19/21  1116 07/20/21  0255    141   K 3.9 4.0   BUN 21 16   CREATININE 0.7 0.6     No results for input(s): PROT, INR in the last 72 hours. No results for input(s): AST, ALT, ALKPHOS, BILIDIR, BILITOT in the last 72 hours. No results for input(s): BNP in the last 72 hours.   No results for input(s): CKTOTAL, CKMB, CKMBINDEX, TROPONINI in the last 72 hours. ASSESSMENT:      Active Problems:    Ectopic atrial tachycardia (HCC)  Resolved Problems:    * No resolved hospital problems. *        PLAN:    Admitted to telemetry for atrial fibrillation with RVR    - cardiology following  - patient given cardizem with improvement of her abnormal rhythm  - found to have T3 elevation and decrease of TSH  - Patient transitioned to Cardizem CD per cardiology, and started on Eliquis.   PT/OT  DVT prophylaxis  Discharge planning    Saul Ventura  7/20/2021  1:14 PM   She feels well and anxious for discharge  Seen by cardiology  Patient has been placed on diltiazem as well as Eliquis 5 p.o. twice daily  Atrial fibrillation likely provoked by overtreatment of hypothyroidism  Decrease levothyroxine to 75 and repeat TSH in 6 weeks per PCP  Okay for discharge from medicine    Above reviewed in conjunction with KORY Gannon - I agree  Pt Seen and Examined, questions answered by me   Gracy Chimes made to HPI/PE/as deemed neccessary by me  Plan formulated in conjunction with me after discussion     Josiane Castano MD

## 2021-07-29 PROBLEM — I48.0 PAROXYSMAL ATRIAL FIBRILLATION (HCC): Status: ACTIVE | Noted: 2021-07-29

## 2021-07-29 PROBLEM — I10 ESSENTIAL HYPERTENSION: Status: ACTIVE | Noted: 2021-07-29

## 2021-07-29 NOTE — PROGRESS NOTES
Physician Progress Note      PATIENT:               Eden Campos  CSN #:                  [id number]  :                       1950  ADMIT DATE:       2021 10:38 AM  DISCH DATE:        2021 6:18 PM  RESPONDING  PROVIDER #:        DARIUS Perez MD          QUERY TEXT:    Dr. Mary Beth Stratton,    Patient admitted with new onset Atrial Fibrillation and noted to have been   started on Eliquis. After study, can the condition being treated with   anticoagulation be specified as:    Risk factors: a [id number] risk of 3 with underlying HTN  Clinical indicators: per the  Cardiology consult  \"oral anticoagulation   will be administered to reduce risk of embolic events\"  Treatment: Cardiology consult, TTE, Eliquis    Thank you  Alexa Moore RN  Clinical Documentation Improvement  [phone]  Options provided:  -- Secondary hypercoagulable state in a patient with atrial fibrillation  -- Prophylactic treatment only  -- Other - I will add my own diagnosis  -- Disagree - Not applicable / Not valid  -- Disagree - Clinically unable to determine / Unknown  -- Refer to Clinical Documentation Reviewer    PROVIDER RESPONSE TEXT:    This patient has a secondary hypercoagulable state in a patient with atrial   fibrillation.     Query created by: Rissa Guerra on 2021 2:38 PM      Electronically signed by:  DARIUS Perez MD 2021 9:18 AM

## 2021-08-03 ENCOUNTER — OFFICE VISIT (OUTPATIENT)
Dept: CARDIOLOGY CLINIC | Age: 71
End: 2021-08-03
Payer: MEDICARE

## 2021-08-03 ENCOUNTER — NURSE ONLY (OUTPATIENT)
Dept: CARDIOLOGY CLINIC | Age: 71
End: 2021-08-03

## 2021-08-03 VITALS
HEIGHT: 62 IN | RESPIRATION RATE: 16 BRPM | DIASTOLIC BLOOD PRESSURE: 50 MMHG | HEART RATE: 77 BPM | BODY MASS INDEX: 21.97 KG/M2 | SYSTOLIC BLOOD PRESSURE: 110 MMHG | OXYGEN SATURATION: 99 % | WEIGHT: 119.4 LBS

## 2021-08-03 DIAGNOSIS — E05.90 HYPERTHYROIDISM: ICD-10-CM

## 2021-08-03 DIAGNOSIS — I48.0 PAROXYSMAL ATRIAL FIBRILLATION (HCC): Primary | ICD-10-CM

## 2021-08-03 DIAGNOSIS — I10 ESSENTIAL HYPERTENSION: ICD-10-CM

## 2021-08-03 DIAGNOSIS — R00.2 PALPITATIONS: ICD-10-CM

## 2021-08-03 PROCEDURE — 1090F PRES/ABSN URINE INCON ASSESS: CPT | Performed by: INTERNAL MEDICINE

## 2021-08-03 PROCEDURE — 3017F COLORECTAL CA SCREEN DOC REV: CPT | Performed by: INTERNAL MEDICINE

## 2021-08-03 PROCEDURE — 99214 OFFICE O/P EST MOD 30 MIN: CPT | Performed by: INTERNAL MEDICINE

## 2021-08-03 PROCEDURE — 1111F DSCHRG MED/CURRENT MED MERGE: CPT | Performed by: INTERNAL MEDICINE

## 2021-08-03 PROCEDURE — G8427 DOCREV CUR MEDS BY ELIG CLIN: HCPCS | Performed by: INTERNAL MEDICINE

## 2021-08-03 PROCEDURE — G8400 PT W/DXA NO RESULTS DOC: HCPCS | Performed by: INTERNAL MEDICINE

## 2021-08-03 PROCEDURE — 4040F PNEUMOC VAC/ADMIN/RCVD: CPT | Performed by: INTERNAL MEDICINE

## 2021-08-03 PROCEDURE — G8420 CALC BMI NORM PARAMETERS: HCPCS | Performed by: INTERNAL MEDICINE

## 2021-08-03 PROCEDURE — 93000 ELECTROCARDIOGRAM COMPLETE: CPT | Performed by: INTERNAL MEDICINE

## 2021-08-03 PROCEDURE — 1036F TOBACCO NON-USER: CPT | Performed by: INTERNAL MEDICINE

## 2021-08-03 PROCEDURE — 1123F ACP DISCUSS/DSCN MKR DOCD: CPT | Performed by: INTERNAL MEDICINE

## 2021-08-03 NOTE — PROGRESS NOTES
OUTPATIENT CARDIOLOGY FOLLOW-UP    Name: Malena Cates    Age: 69 y.o. Primary Care Physician: Jaimie Escamilla MD    Date of Service: 8/3/2021    Chief Complaint: Paroxysmal atrial fibrillation, palpitations, hypertension, hyperthyroidism most likely iatrogenic    Interim History: Since her evaluation during recent hospitalization, the patient has noted recurrent intermittent palpitations similar to those of symptoms previously identified. She additionally continues to note random episodes of localized precordial chest discomfort unchanged from that previously noted albeit less continuous. She is tolerating her existing medical regimen with symptoms only of intermittent dependent pedal edema not present time of her present assessment. During hospitalization, her thyroid supplementation was altered with follow-up of laboratory studies pending. She denies complications beyond that of economically related secondary to her anticoagulation with no symptoms of a focal neurologic origin nor bleeding. Review of Systems: The remainder of a complete multisystem review including consitutional, central nervous, respiratory, circulatory, gastrointestinal, genitourinary, endocrinologic, hematologic, musculoskeletal and psychiatric are negative.     Past Medical History:  Past Medical History:   Diagnosis Date    HTN (hypertension)     PAF (paroxysmal atrial fibrillation) (HCC)        Past Surgical History:  Past Surgical History:   Procedure Laterality Date    HYSTERECTOMY, TOTAL ABDOMINAL         Family History:  Family History   Problem Relation Age of Onset    Cancer Mother         breast    Heart Attack Mother     Diabetes Father     Alzheimer's Disease Father     Cancer Sister     No Known Problems Sister     Diabetes Brother        Social History:  Social History     Socioeconomic History    Marital status:      Spouse name: Not on file    Number of children: Not on file    Years of education: Not on file    Highest education level: Not on file   Occupational History    Not on file   Tobacco Use    Smoking status: Never Smoker    Smokeless tobacco: Never Used   Vaping Use    Vaping Use: Never used   Substance and Sexual Activity    Alcohol use: Not Currently    Drug use: Never    Sexual activity: Not Currently     Partners: Male   Other Topics Concern    Not on file   Social History Narrative    Drinks 1 cup of coffee daily. Social Determinants of Health     Financial Resource Strain:     Difficulty of Paying Living Expenses:    Food Insecurity:     Worried About Running Out of Food in the Last Year:     [address] in the Last Year:    Transportation Needs:     Lack of Transportation (Medical):  Lack of Transportation (Non-Medical):    Physical Activity:     Days of Exercise per Week:     Minutes of Exercise per Session:    Stress:     Feeling of Stress :    Social Connections:     Frequency of Communication with Friends and Family:     Frequency of Social Gatherings with Friends and Family:     Attends Anabaptism Services:     Active Member of Clubs or Organizations:     Attends Club or Organization Meetings:     Marital Status:    Intimate Partner Violence:     Fear of Current or Ex-Partner:     Emotionally Abused:     Physically Abused:     Sexually Abused: Allergies:   Allergies   Allergen Reactions    Statins Other (See Comments)     Muscle aches       Current Medications:  Current Outpatient Medications   Medication Sig Dispense Refill    mirabegron (MYRBETRIQ) 50 MG TB24 Take 50 mg by mouth daily      apixaban (ELIQUIS) 5 MG TABS tablet Take 1 tablet by mouth 2 times daily 60 tablet 0    dilTIAZem (CARDIZEM CD) 120 MG extended release capsule Take 1 capsule by mouth daily 30 capsule 3    levothyroxine (SYNTHROID) 75 MCG tablet Take 1 tablet by mouth Daily 30 tablet 1    ALPRAZolam (XANAX) 1 MG tablet Take 1 mg by mouth 2 times daily as needed for Anxiety.  HYDROcodone-acetaminophen (NORCO) 7.5-325 MG per tablet Take 1 tablet by mouth every 6 hours as needed for Pain.  ibuprofen (ADVIL;MOTRIN) 800 MG tablet Take 800 mg by mouth every 8 hours as needed for Pain      venlafaxine (EFFEXOR XR) 150 MG extended release capsule Take 150 mg by mouth daily      topiramate (TOPAMAX) 100 MG tablet Take 100 mg by mouth 2 times daily       No current facility-administered medications for this visit. Physical Exam:  BP (!) 110/50 (Site: Right Upper Arm, Position: Sitting, Cuff Size: Medium Adult)   Pulse 77   Resp 16   Ht 5' 2\" (1.575 m)   Wt 119 lb 6.4 oz (54.2 kg)   SpO2 99%   BMI 21.84 kg/m²   Wt Readings from Last 3 Encounters:   08/03/21 119 lb 6.4 oz (54.2 kg)   07/20/21 121 lb 14.4 oz (55.3 kg)   05/27/21 118 lb 12.8 oz (53.9 kg)     The patient is awake, alert and in no discomfort or distress. No gross musculoskeletal deformity is present. No significant skin or nail changes are present. Gross examination of head, eyes, nose and throat are negative. Jugular venous pressure is normal and no carotid bruits are present. Normal respiratory effort is noted with no accessory muscle usage present. Lung fields are clear to ascultation. Cardiac examination is notable for a regular rate and rhythm with no palpable thrill. No gallop rhythm or cardiac murmur are identified. A benign abdominal examination is present with no masses or organomegaly. Intact pulses are present throughout all extremities and no peripheral edema is present. No focal neurologic deficits are present.     Laboratory Tests:  Lab Results   Component Value Date    CREATININE 0.6 07/20/2021    BUN 16 07/20/2021     07/20/2021    K 4.0 07/20/2021     (H) 07/20/2021    CO2 23 07/20/2021     No results found for: BNP  Lab Results   Component Value Date    WBC 6.0 07/20/2021    RBC 4.18 07/20/2021    HGB 12.3 07/20/2021    HCT 36.9 07/20/2021    MCV 88.3 07/20/2021    MCH 29.4 07/20/2021    MCHC 33.3 07/20/2021    RDW 12.8 07/20/2021     07/20/2021    MPV 10.8 07/20/2021     No results for input(s): ALKPHOS, ALT, AST, PROT, BILITOT, BILIDIR, LABALBU in the last 72 hours. Lab Results   Component Value Date    MG 1.7 07/19/2021     No results found for: PROTIME, INR  Lab Results   Component Value Date    TSH <0.010 07/19/2021     No components found for: CHLPL  No results found for: TRIG  No results found for: HDL  No results found for: [facility]    Cardiac Tests:  ECG: A resting electrocardiogram demonstrates evidence of sinus rhythm with left atrial enlargement, right axis deviation, low voltage within the limb leads and an intraventricular conduction delay      ASSESSMENT / PLAN: On a clinical basis, the patient remains compensated from a cardiovascular standpoint going symptoms of palpitations in the face of her previously identified paroxysmal atrial fibrillation and in the absence of additional arrhythmia related manifestations. Presently, I have not altered her existing medical regimen and have recommended additional arrhythmia monitoring to assess the presence or absence of recurrent atrial arrhythmias and their burden to guide additional management recommendations. I have recommended the continuation of oral anticoagulation pending this review as well as that of review of her thyroid status in view of recently noted biochemical hyperthyroidism potentially contributing to her atrial arrhythmias. Based on the economic consequences of her direct oral anticoagulant I have attempted to provide her with samples and if available financial assistance in obtaining therapy with alternatives either of rivaroxaban if more economical on the basis of her formulary or that of warfarin as alternatives. Permitted her to resume her normal activities and will defer further assessment and/or management of her atypically described chest discomfort to your discretion. Pending the review of her arrhythmia monitoring, I have recommended deferring her elective noncardiac surgical procedure and will provide additional recommendations as appropriate following its review. I otherwise plan her clinical reassessment proximately 3 months and would happily reevaluate her in the interim should additional cardiovascular difficulties or concerns arise. The patient's current medication list, allergies, problem list and results of all previously ordered testing were reviewed at today's visit. Follow-up office visit in 3 months      Note: This report was completed using computerized voice recognition software. Every effort has been made to ensure accuracy, however; inadvertent computerized transcription errors may be present. Brittany Weinberg.  Monroe Tam, [facility]    An electronic copy of this follow-up progress note was forwarded to Dr. Lenice Riedel

## 2021-08-10 DIAGNOSIS — I48.0 PAROXYSMAL ATRIAL FIBRILLATION (HCC): ICD-10-CM

## 2021-08-10 DIAGNOSIS — E05.90 HYPERTHYROIDISM: ICD-10-CM

## 2021-08-27 ENCOUNTER — TELEPHONE (OUTPATIENT)
Dept: CARDIOLOGY CLINIC | Age: 71
End: 2021-08-27

## 2021-08-27 DIAGNOSIS — I48.0 PAROXYSMAL ATRIAL FIBRILLATION (HCC): ICD-10-CM

## 2021-08-27 DIAGNOSIS — E05.90 HYPERTHYROIDISM: ICD-10-CM

## 2021-08-27 DIAGNOSIS — I47.1 ECTOPIC ATRIAL TACHYCARDIA (HCC): ICD-10-CM

## 2021-08-27 DIAGNOSIS — I48.0 PAF (PAROXYSMAL ATRIAL FIBRILLATION) (HCC): ICD-10-CM

## 2021-08-27 DIAGNOSIS — R07.9 CHEST PAIN, UNSPECIFIED TYPE: Primary | ICD-10-CM

## 2021-08-27 DIAGNOSIS — I10 ESSENTIAL HYPERTENSION: ICD-10-CM

## 2021-08-27 RX ORDER — DILTIAZEM HYDROCHLORIDE 180 MG/1
180 CAPSULE, COATED, EXTENDED RELEASE ORAL DAILY
Qty: 180 CAPSULE | Refills: 3 | Status: SHIPPED
Start: 2021-08-27 | End: 2021-09-30 | Stop reason: SINTOL

## 2021-08-27 RX ORDER — DILTIAZEM HYDROCHLORIDE 180 MG/1
180 CAPSULE, COATED, EXTENDED RELEASE ORAL DAILY
COMMUNITY
End: 2021-08-27 | Stop reason: SDUPTHER

## 2021-08-27 NOTE — TELEPHONE ENCOUNTER
----- Message from Herbie Hill MD sent at 8/27/2021  7:40 AM EDT -----  These notify patient that arrhythmia monitor continues to demonstrate occasional episodes of atrial fibrillation at times with excessive heart rates. Please check if she has had any additional laboratory assessment of her thyroid function since her most recent evaluation and/or changes of thyroid medication dosing. Pending knowledge of this recommend she continue her existing medical regimen. Called pt re above. Most recent T3 was 08/10/21 3.71 and is scanned in to media. Pt labs prior to that were July. Pt on 75 mcg Levothyroxine. She does complain she gets chest pains and her afib bothers her. She feels anxious a lot and she's not sure if its related or the cause.

## 2021-08-27 NOTE — TELEPHONE ENCOUNTER
Diltiazem 180 sent to [facility] scheduled for 09/01/21  OV set for 09/30/21  Pt verbalized understanding.

## 2021-08-27 NOTE — TELEPHONE ENCOUNTER
----- Message from Cosme Lechuga MD sent at 8/27/2021  9:13 AM EDT -----  Please have patient increase diltiazem to 180 mg daily which will require a new prescription for the extended release preparation and also schedule a gated vasodilator myocardial perfusion imaging study to assess the presence or absence of coronary disease to assist with decisions regarding additional management of her arrhythmias now that her thyroid status has stabilized. Please assure that stress test is performed prior to my September office week and add her to schedule to discuss additional recommendations in the face of these changes.

## 2021-08-30 ENCOUNTER — TELEPHONE (OUTPATIENT)
Dept: CARDIOLOGY | Age: 71
End: 2021-08-30

## 2021-09-01 ENCOUNTER — HOSPITAL ENCOUNTER (OUTPATIENT)
Dept: CARDIOLOGY | Age: 71
Discharge: HOME | End: 2021-09-01
Payer: MEDICARE

## 2021-09-01 ENCOUNTER — TELEPHONE (OUTPATIENT)
Dept: CARDIOLOGY CLINIC | Age: 71
End: 2021-09-01

## 2021-09-01 VITALS
SYSTOLIC BLOOD PRESSURE: 120 MMHG | BODY MASS INDEX: 21.9 KG/M2 | DIASTOLIC BLOOD PRESSURE: 64 MMHG | HEIGHT: 62 IN | WEIGHT: 119 LBS | HEART RATE: 86 BPM

## 2021-09-01 DIAGNOSIS — I47.1 ECTOPIC ATRIAL TACHYCARDIA (HCC): ICD-10-CM

## 2021-09-01 DIAGNOSIS — E05.90 HYPERTHYROIDISM: ICD-10-CM

## 2021-09-01 DIAGNOSIS — I48.0 PAF (PAROXYSMAL ATRIAL FIBRILLATION) (HCC): ICD-10-CM

## 2021-09-01 DIAGNOSIS — R07.9 CHEST PAIN, UNSPECIFIED TYPE: ICD-10-CM

## 2021-09-01 DIAGNOSIS — I10 ESSENTIAL HYPERTENSION: ICD-10-CM

## 2021-09-01 LAB
LV EF: 79 %
LVEF MODALITY: NORMAL

## 2021-09-01 PROCEDURE — 78452 HT MUSCLE IMAGE SPECT MULT: CPT | Performed by: INTERNAL MEDICINE

## 2021-09-01 PROCEDURE — 78452 HT MUSCLE IMAGE SPECT MULT: CPT

## 2021-09-01 PROCEDURE — 93017 CV STRESS TEST TRACING ONLY: CPT

## 2021-09-01 PROCEDURE — 93016 CV STRESS TEST SUPVJ ONLY: CPT | Performed by: INTERNAL MEDICINE

## 2021-09-01 PROCEDURE — 2580000003 HC RX 258: Performed by: INTERNAL MEDICINE

## 2021-09-01 PROCEDURE — A9502 TC99M TETROFOSMIN: HCPCS | Performed by: INTERNAL MEDICINE

## 2021-09-01 PROCEDURE — 3430000000 HC RX DIAGNOSTIC RADIOPHARMACEUTICAL: Performed by: INTERNAL MEDICINE

## 2021-09-01 PROCEDURE — 93018 CV STRESS TEST I&R ONLY: CPT | Performed by: INTERNAL MEDICINE

## 2021-09-01 RX ORDER — SODIUM CHLORIDE 0.9 % (FLUSH) 0.9 %
10 SYRINGE (ML) INJECTION PRN
Status: DISCONTINUED | OUTPATIENT
Start: 2021-09-01 | End: 2021-09-02 | Stop reason: HOSPADM

## 2021-09-01 RX ADMIN — SODIUM CHLORIDE, PRESERVATIVE FREE 10 ML: 5 INJECTION INTRAVENOUS at 09:52

## 2021-09-01 RX ADMIN — TETROFOSMIN 8.5 MILLICURIE: 0.23 INJECTION, POWDER, LYOPHILIZED, FOR SOLUTION INTRAVENOUS at 09:53

## 2021-09-01 RX ADMIN — SODIUM CHLORIDE, PRESERVATIVE FREE 10 ML: 5 INJECTION INTRAVENOUS at 11:08

## 2021-09-01 RX ADMIN — TETROFOSMIN 27.5 MILLICURIE: 0.23 INJECTION, POWDER, LYOPHILIZED, FOR SOLUTION INTRAVENOUS at 11:07

## 2021-09-01 NOTE — TELEPHONE ENCOUNTER
----- Message from Bertrand Epperson MD sent at 9/1/2021 12:42 PM EDT -----  Please notify patient that stress test is normal and recommend continuation of recent adjustment of medications and will assess response with further recommendations at the time of follow-up later this month    Called pt re above

## 2021-09-30 ENCOUNTER — OFFICE VISIT (OUTPATIENT)
Dept: CARDIOLOGY CLINIC | Age: 71
End: 2021-09-30
Payer: MEDICARE

## 2021-09-30 VITALS
BODY MASS INDEX: 22.26 KG/M2 | RESPIRATION RATE: 16 BRPM | WEIGHT: 121 LBS | SYSTOLIC BLOOD PRESSURE: 136 MMHG | HEIGHT: 62 IN | HEART RATE: 84 BPM | DIASTOLIC BLOOD PRESSURE: 74 MMHG

## 2021-09-30 DIAGNOSIS — I10 ESSENTIAL HYPERTENSION: ICD-10-CM

## 2021-09-30 DIAGNOSIS — E05.90 HYPERTHYROIDISM: ICD-10-CM

## 2021-09-30 DIAGNOSIS — I48.0 PAROXYSMAL ATRIAL FIBRILLATION (HCC): Primary | ICD-10-CM

## 2021-09-30 DIAGNOSIS — R00.2 PALPITATIONS: ICD-10-CM

## 2021-09-30 PROCEDURE — 1090F PRES/ABSN URINE INCON ASSESS: CPT | Performed by: INTERNAL MEDICINE

## 2021-09-30 PROCEDURE — 4040F PNEUMOC VAC/ADMIN/RCVD: CPT | Performed by: INTERNAL MEDICINE

## 2021-09-30 PROCEDURE — G8400 PT W/DXA NO RESULTS DOC: HCPCS | Performed by: INTERNAL MEDICINE

## 2021-09-30 PROCEDURE — G8420 CALC BMI NORM PARAMETERS: HCPCS | Performed by: INTERNAL MEDICINE

## 2021-09-30 PROCEDURE — 1036F TOBACCO NON-USER: CPT | Performed by: INTERNAL MEDICINE

## 2021-09-30 PROCEDURE — 99214 OFFICE O/P EST MOD 30 MIN: CPT | Performed by: INTERNAL MEDICINE

## 2021-09-30 PROCEDURE — 93000 ELECTROCARDIOGRAM COMPLETE: CPT | Performed by: INTERNAL MEDICINE

## 2021-09-30 PROCEDURE — 3017F COLORECTAL CA SCREEN DOC REV: CPT | Performed by: INTERNAL MEDICINE

## 2021-09-30 PROCEDURE — 1123F ACP DISCUSS/DSCN MKR DOCD: CPT | Performed by: INTERNAL MEDICINE

## 2021-09-30 PROCEDURE — G8427 DOCREV CUR MEDS BY ELIG CLIN: HCPCS | Performed by: INTERNAL MEDICINE

## 2021-09-30 RX ORDER — METOPROLOL SUCCINATE 25 MG/1
25 TABLET, EXTENDED RELEASE ORAL DAILY
Qty: 30 TABLET | Refills: 5 | Status: SHIPPED
Start: 2021-09-30 | End: 2021-09-30

## 2021-09-30 RX ORDER — METOPROLOL SUCCINATE 25 MG/1
25 TABLET, EXTENDED RELEASE ORAL DAILY
Qty: 90 TABLET | Refills: 3 | Status: SHIPPED
Start: 2021-09-30 | End: 2022-07-12 | Stop reason: ALTCHOICE

## 2021-09-30 NOTE — PROGRESS NOTES
OUTPATIENT CARDIOLOGY FOLLOW-UP    Name: Marleny Bradshaw    Age: 66 y.o. Primary Care Physician: Rani Bravo MD    Date of Service: 9/30/2021    Chief Complaint: Paroxysmal atrial fibrillation, palpitations, hypertension, resolved iatrogenic hyperthyroidism    Interim History: Since her most recent evaluation, the patient has remained compensated from a cardiovascular standpoint with significant residual anxiety and occasional palpitations in the absence of arrhythmia related symptomatology. Interim arrhythmia monitoring demonstrates a predominance of sinus rhythm with a 2% atrial fibrillation burden and suboptimal rate control during arrhythmia related episodes with modification of her rate control strategy. In addition, interim reassessment of thyroid function demonstrates the reestablishment of a euthyroid state. To assist in additional management decisions, and interim to size myocardial perfusion imaging study demonstrated an exercise tolerance of 5 minutes 30 seconds on a Handy protocol treadmill achieving 7 METs of activity and 93% of age-predicted maximum heart rate with a clinically and electrocardiographically nonischemic response and without the provocation of atrial arrhythmias. Perfusion imaging demonstrated no evidence of perfusion abnormalities with normal left ventricular systolic function present. Following the alteration of medical therapy, she relates somewhat progressive constipation. Review of Systems: The remainder of a complete multisystem review including consitutional, central nervous, respiratory, circulatory, gastrointestinal, genitourinary, endocrinologic, hematologic, musculoskeletal and psychiatric are negative.     Past Medical History:  Past Medical History:   Diagnosis Date    HTN (hypertension)     PAF (paroxysmal atrial fibrillation) (HCC)        Past Surgical History:  Past Surgical History:   Procedure Laterality Date    HYSTERECTOMY, TOTAL ABDOMINAL Family History:  Family History   Problem Relation Age of Onset   Ranjith Dickens Cancer Mother         breast    Heart Attack Mother     Diabetes Father     Alzheimer's Disease Father     Cancer Sister     No Known Problems Sister     Diabetes Brother        Social History:  Social History     Socioeconomic History    Marital status:      Spouse name: Not on file    Number of children: Not on file    Years of education: Not on file    Highest education level: Not on file   Occupational History    Not on file   Tobacco Use    Smoking status: Never Smoker    Smokeless tobacco: Never Used   Vaping Use    Vaping Use: Never used   Substance and Sexual Activity    Alcohol use: Yes     Comment: on social occa.  Drug use: Never    Sexual activity: Not Currently     Partners: Male   Other Topics Concern    Not on file   Social History Narrative    Drinks 1 cup of coffee daily. Social Determinants of Health     Financial Resource Strain:     Difficulty of Paying Living Expenses:    Food Insecurity:     Worried About Running Out of Food in the Last Year:     [address] in the Last Year:    Transportation Needs:     Lack of Transportation (Medical):  Lack of Transportation (Non-Medical):    Physical Activity:     Days of Exercise per Week:     Minutes of Exercise per Session:    Stress:     Feeling of Stress :    Social Connections:     Frequency of Communication with Friends and Family:     Frequency of Social Gatherings with Friends and Family:     Attends Voodoo Services:     Active Member of Clubs or Organizations:     Attends Club or Organization Meetings:     Marital Status:    Intimate Partner Violence:     Fear of Current or Ex-Partner:     Emotionally Abused:     Physically Abused:     Sexually Abused: Allergies:   Allergies   Allergen Reactions    Statins Other (See Comments)     Muscle aches       Current Medications:  Current Outpatient Medications Medication Sig Dispense Refill    dilTIAZem (DILTIAZEM CD) 180 MG extended release capsule Take 1 capsule by mouth daily 180 capsule 3    mirabegron (MYRBETRIQ) 50 MG TB24 Take 50 mg by mouth daily      apixaban (ELIQUIS) 5 MG TABS tablet Take 1 tablet by mouth 2 times daily 60 tablet 0    levothyroxine (SYNTHROID) 75 MCG tablet Take 1 tablet by mouth Daily 30 tablet 1    ALPRAZolam (XANAX) 1 MG tablet Take 1 mg by mouth 2 times daily as needed for Anxiety.  HYDROcodone-acetaminophen (NORCO) 7.5-325 MG per tablet Take 1 tablet by mouth every 6 hours as needed for Pain.  ibuprofen (ADVIL;MOTRIN) 800 MG tablet Take 800 mg by mouth every 8 hours as needed for Pain      venlafaxine (EFFEXOR XR) 150 MG extended release capsule Take 150 mg by mouth daily      topiramate (TOPAMAX) 100 MG tablet Take 100 mg by mouth 2 times daily       No current facility-administered medications for this visit. Physical Exam:  /74   Pulse 84   Resp 16   Ht 5' 2\" (1.575 m)   Wt 121 lb (54.9 kg)   BMI 22.13 kg/m²   Wt Readings from Last 3 Encounters:   09/30/21 121 lb (54.9 kg)   09/01/21 119 lb (54 kg)   08/03/21 119 lb 6.4 oz (54.2 kg)     The patient is awake, alert and in no discomfort or distress. No gross musculoskeletal deformity is present. No significant skin or nail changes are present. Gross examination of head, eyes, nose and throat are negative. Jugular venous pressure is normal and no carotid bruits are present. Normal respiratory effort is noted with no accessory muscle usage present. Lung fields are clear to ascultation. Cardiac examination is notable for a regular rate and rhythm with no palpable thrill. No gallop rhythm or cardiac murmur are identified. A benign abdominal examination is present with no masses or organomegaly. Intact pulses are present throughout all extremities and no peripheral edema is present. No focal neurologic deficits are present.     Laboratory Tests:  Lab Results   Component Value Date    CREATININE 0.6 07/20/2021    BUN 16 07/20/2021     07/20/2021    K 4.0 07/20/2021     (H) 07/20/2021    CO2 23 07/20/2021     No results found for: BNP  Lab Results   Component Value Date    WBC 6.0 07/20/2021    RBC 4.18 07/20/2021    HGB 12.3 07/20/2021    HCT 36.9 07/20/2021    MCV 88.3 07/20/2021    MCH 29.4 07/20/2021    MCHC 33.3 07/20/2021    RDW 12.8 07/20/2021     07/20/2021    MPV 10.8 07/20/2021     No results for input(s): ALKPHOS, ALT, AST, PROT, BILITOT, BILIDIR, LABALBU in the last 72 hours. Lab Results   Component Value Date    MG 1.7 07/19/2021     No results found for: PROTIME, INR  Lab Results   Component Value Date    TSH <0.010 07/19/2021     No components found for: CHLPL  No results found for: TRIG  No results found for: HDL  No results found for: [address]    Cardiac Tests:  ECG: A resting electrocardiogram demonstrates evidence of sinus rhythm with low voltage, right axis deviation and left atrial enlargement  Last stress test: Exercise myocardial perfusion imaging study demonstrated an exercise tolerance of 5 minutes 30 seconds on a Handy protocol treadmill achieving 7 METs of activity and 93% of age-predicted maximum heart with a clinically and electrocardiographically nonischemic response and no provokable arrhythmias with no evidence of perfusion abnormalities and normal left ventricular systolic function      ASSESSMENT / PLAN: Clinical basis, the patient presently appears compensated from a cardiovascular standpoint and with the origin of her constipation multifactorial in nature. While not likely, a component of her constipation may be related to that of her calcium antagonist and following discussion with her have recommended the conversion of present therapy to that of a beta-blocker with nocturnal with administration to reduce risk of potential fatigue.   I will defer to your further discretion additional management of her gastrointestinal symptoms. Continued monitoring of her thyroid status will be necessary following the adjustment of her thyroid hormone supplementation to achieve sustained euthyroid levels reducing her risk of recurrent arrhythmias. Ongoing aggressive risk factor modification of blood pressure and serum lipids will remain necessary to reduce risk of future atherosclerotic development. I presently plan her clinical reassessment in approximately 3 months and would happily reevaluate her in the interim should additional cardiovascular difficulties or concerns arise. The patient's current medication list, allergies, problem list and results of all previously ordered testing were reviewed at today's visit. Follow-up office visit in 3 months      Note: This report was completed using computerized voice recognition software. Every effort has been made to ensure accuracy, however; inadvertent computerized transcription errors may be present. Julia Avendaño.  Douglas Umanzor, [facility] Cardiology    An electronic copy of this follow-up progress note was forwarded to Dr. Virgilio Landry

## 2022-04-10 ENCOUNTER — PREP FOR PROCEDURE (OUTPATIENT)
Dept: UROLOGY | Age: 72
End: 2022-04-10

## 2022-04-10 RX ORDER — SODIUM CHLORIDE 9 MG/ML
INJECTION, SOLUTION INTRAVENOUS CONTINUOUS
Status: CANCELLED | OUTPATIENT
Start: 2022-04-10

## 2022-04-10 RX ORDER — SODIUM CHLORIDE 0.9 % (FLUSH) 0.9 %
5-40 SYRINGE (ML) INJECTION PRN
Status: CANCELLED | OUTPATIENT
Start: 2022-04-10

## 2022-04-10 RX ORDER — SODIUM CHLORIDE 0.9 % (FLUSH) 0.9 %
5-40 SYRINGE (ML) INJECTION EVERY 12 HOURS SCHEDULED
Status: CANCELLED | OUTPATIENT
Start: 2022-04-10

## 2022-04-10 RX ORDER — SODIUM CHLORIDE 9 MG/ML
25 INJECTION, SOLUTION INTRAVENOUS PRN
Status: CANCELLED | OUTPATIENT
Start: 2022-04-10

## 2022-04-14 ENCOUNTER — ANESTHESIA EVENT (OUTPATIENT)
Dept: OPERATING ROOM | Age: 72
End: 2022-04-14
Payer: MEDICARE

## 2022-04-14 RX ORDER — DILTIAZEM HYDROCHLORIDE 180 MG/1
180 CAPSULE, COATED, EXTENDED RELEASE ORAL DAILY
COMMUNITY
End: 2022-09-28

## 2022-04-14 NOTE — PROGRESS NOTES
[facility] PRE-ADMISSION TESTING INSTRUCTIONS    The Preadmission Testing patient is instructed accordingly using the following criteria (check applicable):    ARRIVAL INSTRUCTIONS:  [x] Parking the day of Surgery is located in the Main Entrance lot. Upon entering the door, make an immediate right to the surgery reception desk    [x] Bring photo ID and insurance card    [] Bring in a copy of Living will or Durable Power of  papers. [x] Please be sure to arrange for responsible adult to provide transportation to and from the hospital    [x] Please arrange for responsible adult to be with you for the 24 hour period post procedure due to having anesthesia      GENERAL INSTRUCTIONS:    [x] Nothing by mouth after midnight, including gum, candy, mints or water    [x] You may brush your teeth, but do not swallow any water    [x] Take medications as instructed with 1-2 oz of water    [] Stop herbal supplements and vitamins 5 days prior to procedure    [x] Follow preop dosing of blood thinners per physician instructions    [] Take 1/2 dose of evening insulin, but no insulin after midnight    [] No oral diabetic medications after midnight    [] If diabetic and have low blood sugar or feel symptomatic, take 1-2oz apple juice only    [] Bring inhalers day of surgery    [] Bring C-PAP/ Bi-Pap day of surgery    [] Bring urine specimen day of surgery    [x] Shower or bath with soap, lather and rinse well, AM of Surgery, no lotion, powders or creams to surgical site    [] Follow bowel prep as instructed per surgeon    [x] No tobacco products within 24 hours of surgery     [x] No alcohol or illegal drug use within 24 hours of surgery.     [x] Jewelry, body piercing's, eyeglasses, contact lenses and dentures are not permitted into surgery (bring cases)      [x] Please do not wear any nail polish, make up or hair products on the day of surgery    [x] You can expect a call the business day prior to procedure to notify you if your arrival time changes    [x] If you receive a survey after surgery we would greatly appreciate your comments    [] Parent/guardian of a minor must accompany their child and remain on the premises  the entire time they are under our care     [] Pediatric patients may bring favorite toy, blanket or comfort item with them    [] A caregiver or family member must remain with the patient during their stay if they are mentally handicapped, have dementia, disoriented or unable to use a call light or would be a safety concern if left unattended    [x] Please notify surgeon if you develop any illness between now and time of surgery (cold, cough, sore throat, fever, nausea, vomiting) or any signs of infections  including skin, wounds, and dental.    [x]  The Outpatient Pharmacy is available to fill your prescription here on your day of surgery, ask your preop nurse for details    [] Other instructions    EDUCATIONAL MATERIALS PROVIDED:    [] PAT Preoperative Education Packet/Booklet     [] Medication List    [] Transfusion bracelet applied with instructions    [] Shower with soap, lather and rinse well, and use CHG wipes provided the evening before surgery as instructed    [] Incentive spirometer with instructions

## 2022-04-14 NOTE — PROGRESS NOTES
Have you been tested for COVID  Yes           Have you been told you were positive for COVID No  Have you had any known exposure to someone that is positive for COVID No  Do you have a cough                   No              Do you have shortness of breath No                 Do you have a sore throat            No                Are you having chills                    No                Are you having muscle aches. No                    Please come to the hospital wearing a mask and have your significant other wear a mask as well. Both of you should check your temperature before leaving to come here,  if it is 100 or higher please call [phone] for instruction.

## 2022-04-15 ENCOUNTER — HOSPITAL ENCOUNTER (OUTPATIENT)
Dept: GENERAL RADIOLOGY | Age: 72
Discharge: HOME | End: 2022-04-17

## 2022-04-15 ENCOUNTER — ANESTHESIA (OUTPATIENT)
Dept: OPERATING ROOM | Age: 72
End: 2022-04-15
Payer: MEDICARE

## 2022-04-15 ENCOUNTER — HOSPITAL ENCOUNTER (OUTPATIENT)
Age: 72
Setting detail: OUTPATIENT SURGERY
Discharge: HOME | End: 2022-04-15
Attending: UROLOGY | Admitting: UROLOGY
Payer: MEDICARE

## 2022-04-15 VITALS
HEART RATE: 68 BPM | DIASTOLIC BLOOD PRESSURE: 60 MMHG | WEIGHT: 120 LBS | HEIGHT: 62 IN | SYSTOLIC BLOOD PRESSURE: 128 MMHG | RESPIRATION RATE: 20 BRPM | TEMPERATURE: 97.2 F | BODY MASS INDEX: 22.08 KG/M2 | OXYGEN SATURATION: 97 %

## 2022-04-15 VITALS
TEMPERATURE: 94.8 F | SYSTOLIC BLOOD PRESSURE: 138 MMHG | DIASTOLIC BLOOD PRESSURE: 74 MMHG | RESPIRATION RATE: 18 BRPM | OXYGEN SATURATION: 99 %

## 2022-04-15 DIAGNOSIS — R52 PAIN: ICD-10-CM

## 2022-04-15 DIAGNOSIS — G89.18 POST-OP PAIN: Primary | ICD-10-CM

## 2022-04-15 LAB
EKG ATRIAL RATE: 72 BPM
EKG P AXIS: -49 DEGREES
EKG P-R INTERVAL: 134 MS
EKG Q-T INTERVAL: 432 MS
EKG QRS DURATION: 90 MS
EKG QTC CALCULATION (BAZETT): 473 MS
EKG R AXIS: 95 DEGREES
EKG T AXIS: 29 DEGREES
EKG VENTRICULAR RATE: 72 BPM

## 2022-04-15 PROCEDURE — 7100000001 HC PACU RECOVERY - ADDTL 15 MIN: Performed by: UROLOGY

## 2022-04-15 PROCEDURE — 7100000000 HC PACU RECOVERY - FIRST 15 MIN: Performed by: UROLOGY

## 2022-04-15 PROCEDURE — 2500000003 HC RX 250 WO HCPCS: Performed by: NURSE ANESTHETIST, CERTIFIED REGISTERED

## 2022-04-15 PROCEDURE — C1897 LEAD, NEUROSTIM TEST KIT: HCPCS | Performed by: UROLOGY

## 2022-04-15 PROCEDURE — 3700000000 HC ANESTHESIA ATTENDED CARE: Performed by: UROLOGY

## 2022-04-15 PROCEDURE — 93005 ELECTROCARDIOGRAM TRACING: CPT | Performed by: NURSE PRACTITIONER

## 2022-04-15 PROCEDURE — 6360000002 HC RX W HCPCS: Performed by: NURSE ANESTHETIST, CERTIFIED REGISTERED

## 2022-04-15 PROCEDURE — 88304 TISSUE EXAM BY PATHOLOGIST: CPT

## 2022-04-15 PROCEDURE — 2709999900 HC NON-CHARGEABLE SUPPLY: Performed by: UROLOGY

## 2022-04-15 PROCEDURE — 7100000010 HC PHASE II RECOVERY - FIRST 15 MIN: Performed by: UROLOGY

## 2022-04-15 PROCEDURE — 3700000001 HC ADD 15 MINUTES (ANESTHESIA): Performed by: UROLOGY

## 2022-04-15 PROCEDURE — 3600000013 HC SURGERY LEVEL 3 ADDTL 15MIN: Performed by: UROLOGY

## 2022-04-15 PROCEDURE — 6360000002 HC RX W HCPCS: Performed by: NURSE PRACTITIONER

## 2022-04-15 PROCEDURE — 7100000011 HC PHASE II RECOVERY - ADDTL 15 MIN: Performed by: UROLOGY

## 2022-04-15 PROCEDURE — 3209999900 FLUORO FOR SURGICAL PROCEDURES

## 2022-04-15 PROCEDURE — 2580000003 HC RX 258: Performed by: NURSE ANESTHETIST, CERTIFIED REGISTERED

## 2022-04-15 PROCEDURE — 2500000003 HC RX 250 WO HCPCS: Performed by: UROLOGY

## 2022-04-15 PROCEDURE — 3600000003 HC SURGERY LEVEL 3 BASE: Performed by: UROLOGY

## 2022-04-15 DEVICE — LEAD STIM TST INTERSTIM: Type: IMPLANTABLE DEVICE | Site: VAGINA | Status: FUNCTIONAL

## 2022-04-15 DEVICE — KIT NEUROSTIMULATOR TST LD FOR SACR NEUROMODULATION BWL: Type: IMPLANTABLE DEVICE | Site: VAGINA | Status: FUNCTIONAL

## 2022-04-15 RX ORDER — SODIUM CHLORIDE 9 MG/ML
INJECTION, SOLUTION INTRAVENOUS CONTINUOUS
Status: DISCONTINUED | OUTPATIENT
Start: 2022-04-15 | End: 2022-04-15 | Stop reason: HOSPADM

## 2022-04-15 RX ORDER — OXYCODONE HYDROCHLORIDE AND ACETAMINOPHEN 5; 325 MG/1; MG/1
1 TABLET ORAL EVERY 4 HOURS PRN
Qty: 10 TABLET | Refills: 0 | Status: SHIPPED | OUTPATIENT
Start: 2022-04-15 | End: 2022-04-22

## 2022-04-15 RX ORDER — BUPIVACAINE HYDROCHLORIDE 5 MG/ML
INJECTION, SOLUTION EPIDURAL; INTRACAUDAL PRN
Status: DISCONTINUED | OUTPATIENT
Start: 2022-04-15 | End: 2022-04-15 | Stop reason: ALTCHOICE

## 2022-04-15 RX ORDER — PROPOFOL 10 MG/ML
INJECTION, EMULSION INTRAVENOUS PRN
Status: DISCONTINUED | OUTPATIENT
Start: 2022-04-15 | End: 2022-04-15 | Stop reason: SDUPTHER

## 2022-04-15 RX ORDER — DEXAMETHASONE SODIUM PHOSPHATE 4 MG/ML
INJECTION, SOLUTION INTRA-ARTICULAR; INTRALESIONAL; INTRAMUSCULAR; INTRAVENOUS; SOFT TISSUE PRN
Status: DISCONTINUED | OUTPATIENT
Start: 2022-04-15 | End: 2022-04-15 | Stop reason: SDUPTHER

## 2022-04-15 RX ORDER — FENTANYL CITRATE 50 UG/ML
INJECTION, SOLUTION INTRAMUSCULAR; INTRAVENOUS PRN
Status: DISCONTINUED | OUTPATIENT
Start: 2022-04-15 | End: 2022-04-15 | Stop reason: SDUPTHER

## 2022-04-15 RX ORDER — SODIUM CHLORIDE 0.9 % (FLUSH) 0.9 %
5-40 SYRINGE (ML) INJECTION PRN
Status: DISCONTINUED | OUTPATIENT
Start: 2022-04-15 | End: 2022-04-15 | Stop reason: HOSPADM

## 2022-04-15 RX ORDER — CEPHALEXIN 500 MG/1
500 CAPSULE ORAL 2 TIMES DAILY
Qty: 10 CAPSULE | Refills: 0 | Status: SHIPPED | OUTPATIENT
Start: 2022-04-15 | End: 2022-04-20

## 2022-04-15 RX ORDER — FENTANYL CITRATE 50 UG/ML
25 INJECTION, SOLUTION INTRAMUSCULAR; INTRAVENOUS EVERY 5 MIN PRN
Status: DISCONTINUED | OUTPATIENT
Start: 2022-04-15 | End: 2022-04-15 | Stop reason: HOSPADM

## 2022-04-15 RX ORDER — SODIUM CHLORIDE 9 MG/ML
INJECTION, SOLUTION INTRAVENOUS PRN
Status: DISCONTINUED | OUTPATIENT
Start: 2022-04-15 | End: 2022-04-15 | Stop reason: HOSPADM

## 2022-04-15 RX ORDER — ONDANSETRON 2 MG/ML
INJECTION INTRAMUSCULAR; INTRAVENOUS PRN
Status: DISCONTINUED | OUTPATIENT
Start: 2022-04-15 | End: 2022-04-15 | Stop reason: SDUPTHER

## 2022-04-15 RX ORDER — SODIUM CHLORIDE 0.9 % (FLUSH) 0.9 %
5-40 SYRINGE (ML) INJECTION EVERY 12 HOURS SCHEDULED
Status: DISCONTINUED | OUTPATIENT
Start: 2022-04-15 | End: 2022-04-15 | Stop reason: HOSPADM

## 2022-04-15 RX ORDER — SODIUM CHLORIDE 9 MG/ML
INJECTION, SOLUTION INTRAVENOUS CONTINUOUS PRN
Status: DISCONTINUED | OUTPATIENT
Start: 2022-04-15 | End: 2022-04-15 | Stop reason: SDUPTHER

## 2022-04-15 RX ORDER — BUPIVACAINE HYDROCHLORIDE AND EPINEPHRINE 2.5; 5 MG/ML; UG/ML
INJECTION, SOLUTION EPIDURAL; INFILTRATION; INTRACAUDAL; PERINEURAL PRN
Status: DISCONTINUED | OUTPATIENT
Start: 2022-04-15 | End: 2022-04-15 | Stop reason: ALTCHOICE

## 2022-04-15 RX ORDER — SODIUM CHLORIDE 9 MG/ML
25 INJECTION, SOLUTION INTRAVENOUS PRN
Status: DISCONTINUED | OUTPATIENT
Start: 2022-04-15 | End: 2022-04-15 | Stop reason: HOSPADM

## 2022-04-15 RX ORDER — TRAMADOL HYDROCHLORIDE 50 MG/1
50 TABLET ORAL
Status: DISCONTINUED | OUTPATIENT
Start: 2022-04-15 | End: 2022-04-15 | Stop reason: HOSPADM

## 2022-04-15 RX ORDER — KETAMINE HYDROCHLORIDE 10 MG/ML
INJECTION, SOLUTION INTRAMUSCULAR; INTRAVENOUS PRN
Status: DISCONTINUED | OUTPATIENT
Start: 2022-04-15 | End: 2022-04-15 | Stop reason: SDUPTHER

## 2022-04-15 RX ORDER — MIDAZOLAM HYDROCHLORIDE 1 MG/ML
INJECTION INTRAMUSCULAR; INTRAVENOUS PRN
Status: DISCONTINUED | OUTPATIENT
Start: 2022-04-15 | End: 2022-04-15 | Stop reason: SDUPTHER

## 2022-04-15 RX ADMIN — FENTANYL CITRATE 25 MCG: 50 INJECTION, SOLUTION INTRAMUSCULAR; INTRAVENOUS at 10:19

## 2022-04-15 RX ADMIN — FENTANYL CITRATE 25 MCG: 50 INJECTION, SOLUTION INTRAMUSCULAR; INTRAVENOUS at 10:09

## 2022-04-15 RX ADMIN — DEXAMETHASONE SODIUM PHOSPHATE 8 MG: 4 INJECTION INTRA-ARTICULAR; INTRALESIONAL; INTRAMUSCULAR; INTRAVENOUS; SOFT TISSUE at 10:18

## 2022-04-15 RX ADMIN — SODIUM CHLORIDE: 9 INJECTION, SOLUTION INTRAVENOUS at 10:31

## 2022-04-15 RX ADMIN — SODIUM CHLORIDE: 9 INJECTION, SOLUTION INTRAVENOUS at 09:34

## 2022-04-15 RX ADMIN — MIDAZOLAM 1 MG: 1 INJECTION INTRAMUSCULAR; INTRAVENOUS at 10:09

## 2022-04-15 RX ADMIN — KETAMINE HYDROCHLORIDE 10 MG: 10 INJECTION INTRAMUSCULAR; INTRAVENOUS at 09:52

## 2022-04-15 RX ADMIN — PROPOFOL 30 MG: 10 INJECTION, EMULSION INTRAVENOUS at 10:13

## 2022-04-15 RX ADMIN — PROPOFOL 50 MCG/KG/MIN: 10 INJECTION, EMULSION INTRAVENOUS at 09:39

## 2022-04-15 RX ADMIN — PROPOFOL 50 MG: 10 INJECTION, EMULSION INTRAVENOUS at 09:40

## 2022-04-15 RX ADMIN — MIDAZOLAM 0.5 MG: 1 INJECTION INTRAMUSCULAR; INTRAVENOUS at 09:38

## 2022-04-15 RX ADMIN — PROPOFOL 30 MG: 10 INJECTION, EMULSION INTRAVENOUS at 10:16

## 2022-04-15 RX ADMIN — PROPOFOL 30 MG: 10 INJECTION, EMULSION INTRAVENOUS at 10:21

## 2022-04-15 RX ADMIN — Medication 2000 MG: at 09:38

## 2022-04-15 RX ADMIN — MIDAZOLAM 0.5 MG: 1 INJECTION INTRAMUSCULAR; INTRAVENOUS at 09:31

## 2022-04-15 RX ADMIN — FENTANYL CITRATE 25 MCG: 50 INJECTION, SOLUTION INTRAMUSCULAR; INTRAVENOUS at 10:30

## 2022-04-15 RX ADMIN — KETAMINE HYDROCHLORIDE 20 MG: 10 INJECTION INTRAMUSCULAR; INTRAVENOUS at 09:48

## 2022-04-15 RX ADMIN — ONDANSETRON 4 MG: 2 INJECTION INTRAMUSCULAR; INTRAVENOUS at 10:18

## 2022-04-15 RX ADMIN — FENTANYL CITRATE 25 MCG: 50 INJECTION, SOLUTION INTRAMUSCULAR; INTRAVENOUS at 10:13

## 2022-04-15 ASSESSMENT — PAIN SCALES - GENERAL: PAINLEVEL_OUTOF10: 0

## 2022-04-15 ASSESSMENT — PAIN DESCRIPTION - PAIN TYPE: TYPE: SURGICAL PAIN

## 2022-04-15 ASSESSMENT — PULMONARY FUNCTION TESTS: PIF_VALUE: 0

## 2022-04-15 NOTE — ANESTHESIA PRE PROCEDURE
Department of Anesthesiology  Preprocedure Note       Name:  Sandra Hudson   Age:  66 y.o.  :  1950                                          MRN:  [id number]         Date:  4/15/2022      Surgeon: Ary Rush):  Marcelino DE GUZMAN Memo, DO    Procedure: Procedure(s):  EXCISION VAGINAL MESH  PERIPHERAL NERVE EVALUATION    Medications prior to admission:   Prior to Admission medications    Medication Sig Start Date End Date Taking? Authorizing Provider   dilTIAZem (CARDIZEM CD) 180 MG extended release capsule Take 180 mg by mouth daily   Yes Historical Provider, MD   apixaban (ELIQUIS) 5 MG TABS tablet Take 1 tablet by mouth 2 times daily 3/16/22   Mary Shannon MD   metoprolol succinate (TOPROL XL) 25 MG extended release tablet TAKE 1 TABLET BY MOUTH DAILY 21   Mary Shannon MD   mirabegron (MYRBETRIQ) 50 MG TB24 Take 50 mg by mouth daily    Historical Provider, MD   levothyroxine (SYNTHROID) 75 MCG tablet Take 1 tablet by mouth Daily  Patient not taking: Reported on 4/15/2022 7/20/21   KORY Bishop   ALPRAZolam [facility]) 1 MG tablet Take 1 mg by mouth 2 times daily as needed for Anxiety. Patient not taking: Reported on 4/15/2022    Historical Provider, MD   HYDROcodone-acetaminophen (NORCO) 7.5-325 MG per tablet Take 1 tablet by mouth every 6 hours as needed for Pain.   Patient not taking: Reported on 4/15/2022    Historical Provider, MD   venlafaxine (EFFEXOR XR) 150 MG extended release capsule Take 150 mg by mouth daily    Historical Provider, MD   topiramate (TOPAMAX) 100 MG tablet Take 100 mg by mouth 2 times daily    Historical Provider, MD       Current medications:    Current Facility-Administered Medications   Medication Dose Route Frequency Provider Last Rate Last Admin    0.9 % sodium chloride infusion   IntraVENous Continuous PEGGY Severino CNP        0.9 % sodium chloride infusion  25 mL IntraVENous PRN PEGGY Severino CNP        ceFAZolin (ANCEF) 2000 mg in sterile water 20 mL IV syringe  2,000 mg IntraVENous On Call to [address], APRN - CNP        sodium chloride flush 0.9 % injection 5-40 mL  5-40 mL IntraVENous 2 times per day Liset Armstrong, APRN - CNP        sodium chloride flush 0.9 % injection 5-40 mL  5-40 mL IntraVENous PRN Liset Armstrong, APRN - CNP           Allergies: Allergies   Allergen Reactions    Statins Other (See Comments)     Muscle aches       Problem List:    Patient Active Problem List   Diagnosis Code    Ectopic atrial tachycardia (HCC) I47.1    Paroxysmal atrial fibrillation (HCC) I48.0    Essential hypertension I10    Hyperthyroidism E05.90    Palpitations R00.2       Past Medical History:        Diagnosis Date    Arthritis     HTN (hypertension)     PAF (paroxysmal atrial fibrillation) (HCC)     Thyroid disease     Urinary frequency     Urinary incontinence        Past Surgical History:        Procedure Laterality Date    HYSTERECTOMY, TOTAL ABDOMINAL         Social History:    Social History     Tobacco Use    Smoking status: Never Smoker    Smokeless tobacco: Never Used   Substance Use Topics    Alcohol use: Yes     Comment: on social occa.                                 Counseling given: Not Answered      Vital Signs (Current):   Vitals:    04/14/22 1044 04/15/22 0827   BP:  (!) 145/70   Pulse:  72   Resp:  15   Temp:  97.9 °F (36.6 °C)   SpO2:  99%   Weight: 120 lb (54.4 kg) 120 lb (54.4 kg)   Height: 5' 2\" (1.575 m) 5' 2\" (1.575 m)                                              BP Readings from Last 3 Encounters:   04/15/22 (!) 145/70   09/30/21 136/74   09/01/21 120/64       NPO Status: Time of last liquid consumption: 1800                        Time of last solid consumption: 1800                        Date of last liquid consumption: 04/14/22                        Date of last solid food consumption: 04/14/22    BMI:   Wt Readings from Last 3 Encounters:   04/15/22 120 lb (54.4 kg)   09/30/21 121 lb (54.9 kg)   09/01/21 119 lb (54 kg)     Body mass index is 21.95 kg/m². CBC:   Lab Results   Component Value Date    WBC 6.0 07/20/2021    RBC 4.18 07/20/2021    HGB 12.3 07/20/2021    HCT 36.9 07/20/2021    MCV 88.3 07/20/2021    RDW 12.8 07/20/2021     07/20/2021       CMP:   Lab Results   Component Value Date     07/20/2021    K 4.0 07/20/2021     07/20/2021    CO2 23 07/20/2021    BUN 16 07/20/2021    CREATININE 0.6 07/20/2021    GFRAA >60 07/20/2021    LABGLOM >60 07/20/2021    GLUCOSE 107 07/20/2021    CALCIUM 8.2 07/20/2021       POC Tests: No results for input(s): POCGLU, POCNA, POCK, POCCL, POCBUN, POCHEMO, POCHCT in the last 72 hours. Coags: No results found for: PROTIME, INR, APTT    HCG (If Applicable): No results found for: PREGTESTUR, PREGSERUM, HCG, HCGQUANT     ABGs: No results found for: PHART, PO2ART, RIQ4DNR, SJW3BFN, BEART, F4KFOWLN     Type & Screen (If Applicable):  No results found for: LABABO, LABRH    Drug/Infectious Status (If Applicable):  No results found for: HIV, HEPCAB    COVID-19 Screening (If Applicable): No results found for: COVID19        Anesthesia Evaluation  Patient summary reviewed no history of anesthetic complications:   Airway: Mallampati: III  TM distance: <3 FB   Neck ROM: full  Mouth opening: < 3 FB Dental:    (+) upper dentures      Pulmonary:Negative Pulmonary ROS breath sounds clear to auscultation                             Cardiovascular:    (+) hypertension:, dysrhythmias: atrial fibrillation,         Rhythm: irregular             Beta Blocker:  Dose within 24 Hrs         Neuro/Psych:   (+) depression/anxiety             GI/Hepatic/Renal:   (+) renal disease (URINARY INCONTINENCE):,           Endo/Other:    (+) hyperthyroidism: arthritis:., .                 Abdominal:             Vascular: negative vascular ROS. Other Findings:           Anesthesia Plan      general     ASA 3       Induction: intravenous.     MIPS: Postoperative opioids intended and Prophylactic antiemetics administered. Anesthetic plan and risks discussed with patient. Plan discussed with CRNA.             [address],    4/15/2022      Note reviewed and agree with plan except surgoen just requested mac for bothe procedures with ga backup

## 2022-04-15 NOTE — BRIEF OP NOTE
Brief Postoperative Note      Patient: Bailey Alba  YOB: 1950  MRN: [id number]    Date of Procedure: 4/15/2022    Pre-Op Diagnosis: OVERACTIVE BLADDER    Post-Op Diagnosis: Same       Procedure(s):  EXCISION VAGINAL MESH  PERIPHERAL NERVE EVALUATION    Surgeon(s):  Maureen Contreras DO    Assistant:  * No surgical staff found *    Anesthesia: General    Estimated Blood Loss (mL): Minimal    Complications: None    Specimens:   * No specimens in log *    Implants:  * No implants in log *      Drains: * No LDAs found *    Findings: see operative report     Electronically signed by Maureen Contreras DO on 4/15/2022 at 8:59 AM

## 2022-04-15 NOTE — PROGRESS NOTES
CLINICAL PHARMACY NOTE: MEDS TO BEDS    Total # of Prescriptions Filled: 1   The following medications were delivered to the patient:  · Cephalexin 500    Additional Documentation:

## 2022-04-15 NOTE — ANESTHESIA POSTPROCEDURE EVALUATION
Department of Anesthesiology  Postprocedure Note    Patient: Juan Manuel Mayberry  MRN: [id number]  YOB: 1950  Date of evaluation: 4/15/2022  Time:  10:37 AM     Procedure Summary     Date: 04/15/22 Room / Location: [facility] OR 03 / [facility]    Anesthesia Start: 9492 Anesthesia Stop: 1495    Procedures:       EXCISION VAGINAL MESH (N/A Vagina )      PERIPHERAL NERVE EVALUATION (N/A Sacrum) Diagnosis: (OVERACTIVE BLADDER)    Surgeons: Shelly Contreras DO Responsible Provider: Po Guerrero DO    Anesthesia Type: general ASA Status: 3          Anesthesia Type: general    Brit Phase I: Brit Score: 10    Brit Phase II:      Last vitals: Reviewed and per EMR flowsheets.        Anesthesia Post Evaluation    Patient location during evaluation: PACU  Patient participation: complete - patient participated  Level of consciousness: awake and alert  Pain score: 0  Airway patency: patent  Nausea & Vomiting: no nausea and no vomiting  Complications: no  Cardiovascular status: blood pressure returned to baseline  Respiratory status: acceptable  Hydration status: euvolemic  Multimodal analgesia pain management approach

## 2022-04-16 NOTE — OP NOTE
[address] [address]                                OPERATIVE REPORT    PATIENT NAME: Allyn Uribe                   :        1950  MED REC NO:   [id number]                            ROOM:  ACCOUNT NO:   [de-identified]                           ADMIT DATE: 04/15/2022  PROVIDER:     Mary Contreras DO    DATE OF PROCEDURE:  04/15/2022    PREOPERATIVE DIAGNOSES:  1.  Urge-related incontinence. 2.  Vaginal mesh extrusion. POSTOPERATIVE DIAGNOSES:  1.  Urge-related incontinence. 2.  Vaginal mesh extrusion. PROCEDURE PERFORMED:  The patient had:  1. Excision of vaginal mesh. 2.  Peripheral nerve evaluation and lead placement. ANESTHESIA:  Monitored anesthesia as well as local infiltration at both  places. INTRAOPERATIVE COMPLICATIONS:  None. PATHOLOGIC SPECIMEN:  Vaginal mesh. STORY ON THIS PATIENT:  This is a pleasant 67-year-old  female  who presented to [address] [place] [address]  on the  aforementioned date with the above diagnoses. In the outpatient  setting, the patient was consented for the above proposed surgical  procedure. The patient understood the risks, the benefits, and the  alternatives of the proposed surgical procedure and consented to have  the procedure done. Please note the patient does have urge-related  incontinence and also had vaginal mesh placed in  by gynecologist  that had some component of extrusion. All options were discussed with  her preoperatively. She understood the risks, the benefits, and the  alternatives of the proposed surgical procedure and consented to have  the procedure done on the aforementioned date.     DESCRIPTION OF PROCEDURE:  This pleasant 67-year-old  female  was brought to the operating room #3 at [address] [place] [address] , placed in the prone position, and induced with  monitored anesthesia. Anesthesia monitored the head and neck area, IV  access, and vital signs throughout the course of the case. Status post  induction, she had localization of the S3 nerve foramen with fluoroscopy  from an anterior and posterior view as well as from a lateral view. I  was able to do directional guide placement through the S3 nerve foramen  with the finder needle and get appropriate jennifer and toe flexion  consistent with the S3 nerve response. After this occurred, I then  placed the percutaneous nerve evaluation leads on to the S3 nerve  foramen bilaterally and _____ she was draped and connected. She was  then rolled into the supine position. Her legs were placed in dorsal  lithotomy position. All underlying points were pressure padded. SCDs  were applied. She was prepped and draped in normal sterile fashion. I  placed a speculum vaginally and a Hernandez catheter via the urethra. You  could palpate mesh towards the right fornix. I was able to infiltrate  this area with Marcaine and epinephrine. I then took a 15 blade and  excised this band and sent it out. I then oversewed this with a 2-0  Vicryl on a UR-6 in interrupted fashion. No packing was left. Hernandez  catheter was removed. She awoke from anesthesia without complications  and brought to PACU in stable condition. PLAN:  1. She could be discharged home today. 2.  Pain medication and antibiotics were written. 3.  No intraoperative complications. 4.  She will follow up on Tuesday at which time the leads can be  removed. 5.  If she has a positive response, we will eventually do a full  InterStim implant.         [address] DO    D: 04/15/2022 10:28:24       T: 04/15/2022 12:11:49     [id number]  Job#: [id number]     Doc#: [id number]    CC:  Primary Care Physician

## 2022-06-21 ENCOUNTER — PREP FOR PROCEDURE (OUTPATIENT)
Dept: UROLOGY | Age: 72
End: 2022-06-21

## 2022-06-21 RX ORDER — SODIUM CHLORIDE 0.9 % (FLUSH) 0.9 %
5-40 SYRINGE (ML) INJECTION PRN
Status: CANCELLED | OUTPATIENT
Start: 2022-06-21

## 2022-06-21 RX ORDER — SODIUM CHLORIDE 9 MG/ML
INJECTION, SOLUTION INTRAVENOUS PRN
Status: CANCELLED | OUTPATIENT
Start: 2022-06-21

## 2022-06-21 RX ORDER — SODIUM CHLORIDE 0.9 % (FLUSH) 0.9 %
5-40 SYRINGE (ML) INJECTION EVERY 12 HOURS SCHEDULED
Status: CANCELLED | OUTPATIENT
Start: 2022-06-21

## 2022-06-22 NOTE — PROGRESS NOTES
[facility] PRE-ADMISSION TESTING INSTRUCTIONS    The Preadmission Testing patient is instructed accordingly using the following criteria (check applicable):    ARRIVAL INSTRUCTIONS:  [x] Parking the day of Surgery is located in the Main Entrance lot. Upon entering the door, make an immediate right to the surgery reception desk    [x] Bring photo ID and insurance card    [] Bring in a copy of Living will or Durable Power of  papers. [x] Please be sure to arrange for responsible adult to provide transportation to and from the hospital    [x] Please arrange for responsible adult to be with you for the 24 hour period post procedure due to having anesthesia    [x] If you awake am of surgery not feeling well or have temperature >100 please call [phone]    GENERAL INSTRUCTIONS:    [x] Nothing by mouth after midnight, including gum, candy, mints or water    [x] You may brush your teeth, but do not swallow any water    [x] Take medications as instructed with 1-2 oz of water    [] Stop herbal supplements and vitamins 5 days prior to procedure    [x] Follow preop dosing of blood thinners per physician instructions    [] Take 1/2 dose of evening insulin, but no insulin after midnight    [] No oral diabetic medications after midnight    [] If diabetic and have low blood sugar or feel symptomatic, take 1-2oz apple juice only    [] Bring inhalers day of surgery    [] Bring C-PAP/ Bi-Pap day of surgery    [] Bring urine specimen day of surgery    [x] Shower or bath with soap, lather and rinse well, AM of Surgery, no lotion, powders or creams to surgical site    [] Follow bowel prep as instructed per surgeon    [x] No tobacco products within 24 hours of surgery     [x] No alcohol or illegal drug use within 24 hours of surgery.     [x] Jewelry, body piercing's, eyeglasses, contact lenses and dentures are not permitted into surgery (bring cases)      [x] Please do not wear any nail polish, make up or hair products on the day of surgery    [x] You can expect a call the business day prior to procedure to notify you if your arrival time changes    [x] If you receive a survey after surgery we would greatly appreciate your comments    [] Parent/guardian of a minor must accompany their child and remain on the premises  the entire time they are under our care     [] Pediatric patients may bring favorite toy, blanket or comfort item with them    [] A caregiver or family member must remain with the patient during their stay if they are mentally handicapped, have dementia, disoriented or unable to use a call light or would be a safety concern if left unattended    [x] Please notify surgeon if you develop any illness between now and time of surgery (cold, cough, sore throat, fever, nausea, vomiting) or any signs of infections  including skin, wounds, and dental.    [x]  The Outpatient Pharmacy is available to fill your prescription here on your day of surgery, ask your preop nurse for details    [] Other instructions    EDUCATIONAL MATERIALS PROVIDED:    [] PAT Preoperative Education Packet/Booklet     [] Medication List    [] Transfusion bracelet applied with instructions    [] Shower with soap, lather and rinse well, and use CHG wipes provided the evening before surgery as instructed    [] Incentive spirometer with instructions

## 2022-06-24 ENCOUNTER — HOSPITAL ENCOUNTER (OUTPATIENT)
Dept: GENERAL RADIOLOGY | Age: 72
Setting detail: OUTPATIENT SURGERY
Discharge: HOME | End: 2022-06-26
Attending: UROLOGY
Payer: MEDICARE

## 2022-06-24 ENCOUNTER — ANESTHESIA EVENT (OUTPATIENT)
Dept: OPERATING ROOM | Age: 72
End: 2022-06-24
Payer: MEDICARE

## 2022-06-24 ENCOUNTER — HOSPITAL ENCOUNTER (OUTPATIENT)
Age: 72
Setting detail: OUTPATIENT SURGERY
Discharge: HOME | End: 2022-06-24
Attending: UROLOGY | Admitting: UROLOGY
Payer: MEDICARE

## 2022-06-24 ENCOUNTER — ANESTHESIA (OUTPATIENT)
Dept: OPERATING ROOM | Age: 72
End: 2022-06-24
Payer: MEDICARE

## 2022-06-24 VITALS
HEIGHT: 62 IN | WEIGHT: 120 LBS | HEART RATE: 74 BPM | BODY MASS INDEX: 22.08 KG/M2 | RESPIRATION RATE: 12 BRPM | OXYGEN SATURATION: 100 % | SYSTOLIC BLOOD PRESSURE: 129 MMHG | TEMPERATURE: 97.5 F | DIASTOLIC BLOOD PRESSURE: 66 MMHG

## 2022-06-24 DIAGNOSIS — R52 PAIN: ICD-10-CM

## 2022-06-24 PROCEDURE — 2709999900 HC NON-CHARGEABLE SUPPLY: Performed by: UROLOGY

## 2022-06-24 PROCEDURE — C1897 LEAD, NEUROSTIM TEST KIT: HCPCS | Performed by: UROLOGY

## 2022-06-24 PROCEDURE — C1787 PATIENT PROGR, NEUROSTIM: HCPCS | Performed by: UROLOGY

## 2022-06-24 PROCEDURE — 3700000001 HC ADD 15 MINUTES (ANESTHESIA): Performed by: UROLOGY

## 2022-06-24 PROCEDURE — 3600000003 HC SURGERY LEVEL 3 BASE: Performed by: UROLOGY

## 2022-06-24 PROCEDURE — 6360000002 HC RX W HCPCS: Performed by: ANESTHESIOLOGIST ASSISTANT

## 2022-06-24 PROCEDURE — 3600000013 HC SURGERY LEVEL 3 ADDTL 15MIN: Performed by: UROLOGY

## 2022-06-24 PROCEDURE — C1778 LEAD, NEUROSTIMULATOR: HCPCS | Performed by: UROLOGY

## 2022-06-24 PROCEDURE — 7100000011 HC PHASE II RECOVERY - ADDTL 15 MIN: Performed by: UROLOGY

## 2022-06-24 PROCEDURE — 3700000000 HC ANESTHESIA ATTENDED CARE: Performed by: UROLOGY

## 2022-06-24 PROCEDURE — 7100000010 HC PHASE II RECOVERY - FIRST 15 MIN: Performed by: UROLOGY

## 2022-06-24 PROCEDURE — 3209999900 FLUORO FOR SURGICAL PROCEDURES

## 2022-06-24 PROCEDURE — 6360000002 HC RX W HCPCS: Performed by: NURSE PRACTITIONER

## 2022-06-24 PROCEDURE — 2580000003 HC RX 258: Performed by: NURSE PRACTITIONER

## 2022-06-24 PROCEDURE — 2780000010 HC IMPLANT OTHER: Performed by: UROLOGY

## 2022-06-24 PROCEDURE — 2500000003 HC RX 250 WO HCPCS: Performed by: UROLOGY

## 2022-06-24 DEVICE — NEUROSTIMULATOR INTERSTIM X RECHRGE FREE TORQ WRNCH PROD: Type: IMPLANTABLE DEVICE | Site: BACK | Status: FUNCTIONAL

## 2022-06-24 DEVICE — ENVELOPE PACEMKR M W2.5XL2.7IN ABSRB ANTIBACT TYRX: Type: IMPLANTABLE DEVICE | Site: BACK | Status: FUNCTIONAL

## 2022-06-24 DEVICE — KIT LEAD SURE SCAN INTERSTIM MRI: Type: IMPLANTABLE DEVICE | Site: BACK | Status: FUNCTIONAL

## 2022-06-24 RX ORDER — TRAMADOL HYDROCHLORIDE 50 MG/1
100 TABLET ORAL PRN
Status: CANCELLED | OUTPATIENT
Start: 2022-06-24 | End: 2022-06-24

## 2022-06-24 RX ORDER — SODIUM CHLORIDE 0.9 % (FLUSH) 0.9 %
5-40 SYRINGE (ML) INJECTION PRN
Status: DISCONTINUED | OUTPATIENT
Start: 2022-06-24 | End: 2022-06-24 | Stop reason: HOSPADM

## 2022-06-24 RX ORDER — SODIUM CHLORIDE 9 MG/ML
INJECTION, SOLUTION INTRAVENOUS PRN
Status: DISCONTINUED | OUTPATIENT
Start: 2022-06-24 | End: 2022-06-24 | Stop reason: HOSPADM

## 2022-06-24 RX ORDER — PROPOFOL 10 MG/ML
INJECTION, EMULSION INTRAVENOUS PRN
Status: DISCONTINUED | OUTPATIENT
Start: 2022-06-24 | End: 2022-06-24 | Stop reason: SDUPTHER

## 2022-06-24 RX ORDER — TRAMADOL HYDROCHLORIDE 50 MG/1
50 TABLET ORAL PRN
Status: CANCELLED | OUTPATIENT
Start: 2022-06-24 | End: 2022-06-24

## 2022-06-24 RX ORDER — SODIUM CHLORIDE 0.9 % (FLUSH) 0.9 %
5-40 SYRINGE (ML) INJECTION EVERY 12 HOURS SCHEDULED
Status: CANCELLED | OUTPATIENT
Start: 2022-06-24

## 2022-06-24 RX ORDER — BUPIVACAINE HYDROCHLORIDE 5 MG/ML
INJECTION, SOLUTION EPIDURAL; INTRACAUDAL PRN
Status: DISCONTINUED | OUTPATIENT
Start: 2022-06-24 | End: 2022-06-24 | Stop reason: ALTCHOICE

## 2022-06-24 RX ORDER — MIDAZOLAM HYDROCHLORIDE 1 MG/ML
INJECTION INTRAMUSCULAR; INTRAVENOUS PRN
Status: DISCONTINUED | OUTPATIENT
Start: 2022-06-24 | End: 2022-06-24 | Stop reason: SDUPTHER

## 2022-06-24 RX ORDER — SODIUM CHLORIDE 9 MG/ML
INJECTION, SOLUTION INTRAVENOUS PRN
Status: CANCELLED | OUTPATIENT
Start: 2022-06-24

## 2022-06-24 RX ORDER — CEPHALEXIN 500 MG/1
500 CAPSULE ORAL 2 TIMES DAILY
Qty: 10 CAPSULE | Refills: 0 | Status: SHIPPED | OUTPATIENT
Start: 2022-06-24 | End: 2022-06-29

## 2022-06-24 RX ORDER — SODIUM CHLORIDE 0.9 % (FLUSH) 0.9 %
5-40 SYRINGE (ML) INJECTION PRN
Status: CANCELLED | OUTPATIENT
Start: 2022-06-24

## 2022-06-24 RX ORDER — FENTANYL CITRATE 50 UG/ML
INJECTION, SOLUTION INTRAMUSCULAR; INTRAVENOUS PRN
Status: DISCONTINUED | OUTPATIENT
Start: 2022-06-24 | End: 2022-06-24 | Stop reason: SDUPTHER

## 2022-06-24 RX ORDER — SODIUM CHLORIDE 0.9 % (FLUSH) 0.9 %
5-40 SYRINGE (ML) INJECTION EVERY 12 HOURS SCHEDULED
Status: DISCONTINUED | OUTPATIENT
Start: 2022-06-24 | End: 2022-06-24 | Stop reason: HOSPADM

## 2022-06-24 RX ADMIN — FENTANYL CITRATE 50 MCG: 50 INJECTION, SOLUTION INTRAMUSCULAR; INTRAVENOUS at 08:22

## 2022-06-24 RX ADMIN — PROPOFOL 85 MCG/KG/MIN: 10 INJECTION, EMULSION INTRAVENOUS at 08:27

## 2022-06-24 RX ADMIN — MIDAZOLAM 1 MG: 1 INJECTION INTRAMUSCULAR; INTRAVENOUS at 08:14

## 2022-06-24 RX ADMIN — FENTANYL CITRATE 25 MCG: 50 INJECTION, SOLUTION INTRAMUSCULAR; INTRAVENOUS at 08:30

## 2022-06-24 RX ADMIN — PROPOFOL 40 MG: 10 INJECTION, EMULSION INTRAVENOUS at 08:26

## 2022-06-24 RX ADMIN — SODIUM CHLORIDE: 9 INJECTION, SOLUTION INTRAVENOUS at 08:14

## 2022-06-24 RX ADMIN — FENTANYL CITRATE 25 MCG: 50 INJECTION, SOLUTION INTRAMUSCULAR; INTRAVENOUS at 08:56

## 2022-06-24 RX ADMIN — Medication 2000 MG: at 08:27

## 2022-06-24 ASSESSMENT — PAIN SCALES - GENERAL
PAINLEVEL_OUTOF10: 0
PAINLEVEL_OUTOF10: 0

## 2022-06-24 ASSESSMENT — PAIN - FUNCTIONAL ASSESSMENT: PAIN_FUNCTIONAL_ASSESSMENT: 0-10

## 2022-06-24 NOTE — PROGRESS NOTES
CLINICAL PHARMACY NOTE: MEDS TO BEDS    Total # of Prescriptions Filled: 1   The following medications were delivered to the patient:  · Cephalexin 500 mg    Additional Documentation:

## 2022-06-24 NOTE — H&P
6/24/2022 8:07 AM  Service: Urology  Group: [facility])    Marleny Bradshaw  [id number]     Chief Complaint: urge related incontinence    History of Present Illness: The patient is a 66 y.o. female patient who presents with The above    Past Medical History:   Diagnosis Date    Arthritis     HTN (hypertension)     PAF (paroxysmal atrial fibrillation) ([address]     Thyroid disease     Urinary incontinence        Past Surgical History:   Procedure Laterality Date    COLONOSCOPY      HYSTERECTOMY, TOTAL ABDOMINAL (CERVIX REMOVED)      PAIN MANAGEMENT PROCEDURE N/A 4/15/2022    PERIPHERAL NERVE EVALUATION performed by Wendy Contreras DO at [facility] N/A 4/15/2022    EXCISION VAGINAL MESH performed by Wendy Contreras DO at [facility] OR       Medications Prior to Admission:    Medications Prior to Admission: dilTIAZem (CARDIZEM CD) 180 MG extended release capsule, Take 180 mg by mouth daily  apixaban (ELIQUIS) 5 MG TABS tablet, Take 1 tablet by mouth 2 times daily  metoprolol succinate (TOPROL XL) 25 MG extended release tablet, TAKE 1 TABLET BY MOUTH DAILY  mirabegron (MYRBETRIQ) 50 MG TB24, Take 50 mg by mouth daily  levothyroxine (SYNTHROID) 75 MCG tablet, Take 1 tablet by mouth Daily  HYDROcodone-acetaminophen (NORCO) 7.5-325 MG per tablet, Take 1 tablet by mouth every 6 hours as needed for Pain. venlafaxine (EFFEXOR XR) 150 MG extended release capsule, Take 150 mg by mouth daily  topiramate (TOPAMAX) 100 MG tablet, Take 100 mg by mouth 2 times daily    Allergies:    Statins    Social History:    reports that she has never smoked. She has never used smokeless tobacco. She reports current alcohol use. She reports that she does not use drugs.     Family History:   Non-contributory to this urological problem  family history includes Alzheimer's Disease in her father; Cancer in her mother and sister; Diabetes in her brother and father; Heart Attack in her mother; No Known Problems in her sister. Review of Systems:  Respiratory: negative for cough and hemoptysis  Cardiovascular: negative for chest pain and dyspnea  Gastrointestinal: negative for abdominal pain, diarrhea, nausea and vomiting  Derm: negative for rash and skin lesion(s)  Neurological: negative for seizures and tremors  Endocrine: negative for diabetic symptoms including polydipsia and polyuria  : As above in the HPI, otherwise negative  All other reviews are negative    Physical Exam:   Vitals: BP (!) 164/74   Pulse 60   Temp 97.4 °F (36.3 °C) (Temporal)   Resp 16   Ht 5' 2\" (1.575 m)   Wt 120 lb (54.4 kg)   SpO2 100%   BMI 21.95 kg/m²   General:  Awake, alert, oriented X 3. Well developed, well nourished, well groomed. No apparent distress. HEENT:  Normocephalic, atraumatic. Pupils equal, round. No scleral icterus. No conjunctival injection. Normal lips, teeth, and gums. No nasal discharge. Neck:  Supple, no masses. Heart:  RRR  Lungs:  No audible wheezing. Respirations symmetric and non-labored. Abdomen:  soft, nontender, no masses, no organomegaly, no peritoneal signs  Extremities:  No clubbing, cyanosis, or edema  Skin:  Warm and dry, no open lesions or rashes  Neuro:  Cranial nerves 2-12 intact, no focal deficits  Rectal: deferred  Genitalia:  Hernandez no    Labs:   No results for input(s): WBC, RBC, HGB, HCT, MCV, MCH, MCHC, RDW, PLT, MPV in the last 72 hours. No results for input(s): CREATININE in the last 72 hours.     Images:      Assessment: Cachorro Downey 66 y.o. female     Urge related incontinence    Plan:    See the outpatient H&P  All options were discussed  The patient family is present  Progress to the inerstim X   The risks, benefits, and alternatives were discussed  NPO  DVT prophylaxis  Pre-op antibiotics      DO [facility]  Urology

## 2022-06-24 NOTE — BRIEF OP NOTE
Brief Postoperative Note      Patient: Suni Castellanos  YOB: 1950  MRN: [id number]    Date of Procedure: 6/24/2022    Pre-Op Diagnosis: Urgency of micturition [R39.15]  Overactive bladder [N32.81]  Urinary frequency [R35.0]    Post-Op Diagnosis: Same       Procedure(s):  PERCUTANEOUS IMPLANT OF NEURO STIM ELECTRODES INCISION AND SUBCUTANEOUS PLACEMENT OF PERIPHERAL NEUROSTIM PULSE GENERATOR    Surgeon(s):  Dav Contreras DO    Assistant:  * No surgical staff found *    Anesthesia: Monitor Anesthesia Care    Estimated Blood Loss (mL): Minimal    Complications: None    Specimens:   * No specimens in log *    Implants:  * No implants in log *      Drains: * No LDAs found *    Findings: see operative report     Electronically signed by Bolivar Contreras DO on 6/24/2022 at 8:19 AM

## 2022-06-24 NOTE — ANESTHESIA PRE PROCEDURE
Department of Anesthesiology  Preprocedure Note       Name:  Susana Beebe   Age:  66 y.o.  :  1950                                          MRN:  [id number]         Date:  2022      Surgeon: Kelly Maddox):  Sherald Spurling A Memo,     Procedure: Procedure(s):  PERCUTANEOUS IMPLANT OF NEURO STIM ELECTRODES INCISION AND SUBCUTANEOUS PLACEMENT OF PERIPHERAL NEUROSTIM PULSE GENERATOR    Medications prior to admission:   Prior to Admission medications    Medication Sig Start Date End Date Taking? Authorizing Provider   dilTIAZem (CARDIZEM CD) 180 MG extended release capsule Take 180 mg by mouth daily    Historical Provider, MD   apixaban (ELIQUIS) 5 MG TABS tablet Take 1 tablet by mouth 2 times daily 3/16/22   Elliott Mchugh MD   metoprolol succinate (TOPROL XL) 25 MG extended release tablet TAKE 1 TABLET BY MOUTH DAILY 21   Elliott Mchugh MD   mirabegron (MYRBETRIQ) 50 MG TB24 Take 50 mg by mouth daily    Historical Provider, MD   levothyroxine (SYNTHROID) 75 MCG tablet Take 1 tablet by mouth Daily 21   KORY Haji   HYDROcodone-acetaminophen (NORCO) 7.5-325 MG per tablet Take 1 tablet by mouth every 6 hours as needed for Pain. Historical Provider, MD   venlafaxine (EFFEXOR XR) 150 MG extended release capsule Take 150 mg by mouth daily    Historical Provider, MD   topiramate (TOPAMAX) 100 MG tablet Take 100 mg by mouth 2 times daily    Historical Provider, MD       Current medications:    No current facility-administered medications for this visit. No current outpatient medications on file.      Facility-Administered Medications Ordered in Other Visits   Medication Dose Route Frequency Provider Last Rate Last Admin    0.9 % sodium chloride infusion   IntraVENous PRN PEGGY Severino CNP        ceFAZolin (ANCEF) 2000 mg in sterile water 20 mL IV syringe  2,000 mg IntraVENous On Call to [address], APRN - CNP        sodium chloride flush 0.9 % injection 5-40 mL  5-40 mL IntraVENous 2 times per day PEGGY Severino CNP        sodium chloride flush 0.9 % injection 5-40 mL  5-40 mL IntraVENous PRN PEGGY Severino CNP           Allergies: Allergies   Allergen Reactions    Statins Other (See Comments)     Muscle aches       Problem List:    Patient Active Problem List   Diagnosis Code    Ectopic atrial tachycardia (HCC) I47.1    Paroxysmal atrial fibrillation (HCC) I48.0    Essential hypertension I10    Hyperthyroidism E05.90    Palpitations R00.2       Past Medical History:        Diagnosis Date    Arthritis     HTN (hypertension)     [facility]     Thyroid disease     Urinary incontinence        Past Surgical History:        Procedure Laterality Date    COLONOSCOPY      HYSTERECTOMY, TOTAL ABDOMINAL (CERVIX REMOVED)      PAIN MANAGEMENT PROCEDURE N/A 4/15/2022    PERIPHERAL NERVE EVALUATION performed by Irving Contreras DO at [facility] N/A 4/15/2022    EXCISION VAGINAL MESH performed by Irving Contreras DO at [facility] OR       Social History:    Social History     Tobacco Use    Smoking status: Never Smoker    Smokeless tobacco: Never Used   Substance Use Topics    Alcohol use: Yes     Comment: on social occa. Counseling given: Not Answered      Vital Signs (Current): There were no vitals filed for this visit.                                            BP Readings from Last 3 Encounters:   06/24/22 (!) 164/74   04/15/22 138/74   04/15/22 128/60       NPO Status:                                                                                 BMI:   Wt Readings from Last 3 Encounters:   06/24/22 120 lb (54.4 kg)   04/15/22 120 lb (54.4 kg)   09/30/21 121 lb (54.9 kg)     There is no height or weight on file to calculate BMI.    CBC:   Lab Results   Component Value Date    WBC 6.0 07/20/2021    RBC 4.18 07/20/2021    HGB 12.3 07/20/2021    HCT 36.9 07/20/2021    MCV 88.3 07/20/2021    RDW 12.8 07/20/2021     07/20/2021       CMP:   Lab Results   Component Value Date     07/20/2021    K 4.0 07/20/2021     07/20/2021    CO2 23 07/20/2021    BUN 16 07/20/2021    CREATININE 0.6 07/20/2021    GFRAA >60 07/20/2021    LABGLOM >60 07/20/2021    GLUCOSE 107 07/20/2021    CALCIUM 8.2 07/20/2021       POC Tests: No results for input(s): POCGLU, POCNA, POCK, POCCL, POCBUN, POCHEMO, POCHCT in the last 72 hours. Coags: No results found for: PROTIME, INR, APTT    HCG (If Applicable): No results found for: PREGTESTUR, PREGSERUM, HCG, HCGQUANT     ABGs: No results found for: PHART, PO2ART, YDJ4VBR, LXF2LQK, BEART, O0AXYLSS     Type & Screen (If Applicable):  No results found for: LABABO, LABRH    Drug/Infectious Status (If Applicable):  No results found for: HIV, HEPCAB    COVID-19 Screening (If Applicable): No results found for: COVID19        Anesthesia Evaluation  Patient summary reviewed no history of anesthetic complications:   Airway: Mallampati: III  TM distance: <3 FB   Neck ROM: full  Mouth opening: < 3 FB   Dental:    (+) upper dentures      Pulmonary:Negative Pulmonary ROS breath sounds clear to auscultation                             Cardiovascular:    (+) hypertension:, dysrhythmias (Ectopic atrial tachycardia ([address])): atrial fibrillation,         Rhythm: irregular  Rate: normal           Beta Blocker:  Dose within 24 Hrs         Neuro/Psych:   (+) depression/anxiety             GI/Hepatic/Renal:   (+) renal disease (URINARY INCONTINENCE):,           Endo/Other:    (+) hyperthyroidism, blood dyscrasia: anticoagulation therapy, arthritis:., .                 Abdominal:             Vascular: negative vascular ROS. Other Findings:             Anesthesia Plan      MAC     ASA 3       Induction: intravenous. Anesthetic plan and risks discussed with patient. Plan discussed with CRNA.               Primo Barbosa MD   6/24/2022

## 2022-06-25 NOTE — OP NOTE
[address] [place] [address]                                OPERATIVE REPORT    PATIENT NAME: Remy Brandt                   :        1950  MED REC NO:   [id number]                            ROOM:  ACCOUNT NO:   [de-identified]                           ADMIT DATE: 2022  PROVIDER:     Mercedez Contreras DO    DATE OF PROCEDURE:  2022    PREOPERATIVE DIAGNOSIS:  Urge-related incontinence. POSTOPERATIVE DIAGNOSIS:  Urge-related incontinence. PROCEDURE PERFORMED:  The patient had:  1. Percutaneous lead implantation on the S3 nerve _____. 2.  InterStim X neuromodulation implant. ANESTHESIA:  Monitored anesthesia as well as local infiltration. SURGEON:  Dav Contreras DO.    ESTIMATED BLOOD LOSS:  Less than 5 mL. INTRAOPERATIVE COMPLICATION:  None. PATHOLOGIC SPECIMEN:  None. STORY:  A 69-year-old  female presented to [address] [place] [address] on the aforementioned date with the  above diagnosis. In the outpatient setting, the patient was consented  for the above proposed surgical procedure. The patient understood the  risks, the benefits, and the alternatives of the proposed surgical  procedure and consented to have the procedure done. Please note the  patient underwent successful peripheral nerve evaluation and lead  placement by myself, had good response, had reduction in frequency,  urgency, nocturia, incontinence by 90%. She elected to progress with  the above proposed surgical procedure. She understood the risks, the  benefits, the alternatives, and elected to proceed. DESCRIPTION OF PROCEDURE:  This pleasant 69-year-old  female  was brought to the operating room #3 at [address] [place] [address], placed in the prone position, induced with monitored  anesthesia.   Anesthesia monitored the head and neck area, IV access, and  vital signs throughout the course of the case. Status post induction,  she was prepped and draped in normal sterile fashion. Exposure of her  toe as well as her anus occurred with taping. It was at this point that  I did triangulation of the S3 nerve foramen with the fluoroscopy,  initially from the anteroposterior view, but then from the lateral view. Once I got appropriate triangulation, I was able to get a directional  finder needle on to the S3 nerve foramen and get appropriate jennifer and  toe flexion. At this point, I removed the inner component and placed  the directional guide down on to the S3 nerve, removed this initial  finder needle. I used an 11 blade and made a small stab incision at the  level of the skin. I took the T component obturator inner and outer  component and placed it down into the sacrum. At this point, I removed  the inner component of that. I took the percutaneous lead, placed it on  the S3 nerve foramen. I tested at 0, 1, 2, and 3 and got good jennifer  and toe flexions at all four levels. So, at this point, I then went to  her right buttock. I tunneled the lead over. I took the InterStim X  unit, connected it, placed it within the TYRX mesh, placed it within the  pouch, which was created. I then got appropriate impedance testing. I  then closed this pouch with a 2-0 Vicryl on an SH and a 4-0 Monocryl. Dermabond was applied. Dressing was applied. She awoke from anesthesia  without complication, brought to PACU in stable condition. PLAN:  1. She could be discharged home today. 2.  She will be turned on in the PACU. 3.  Antibiotics were written.         Jordy Arnett DO    D: 06/24/2022 9:00:39       T: 06/24/2022 11:05:09     [id number]  Job#: [id number]     Doc#: [id number]    CC:  _____

## 2022-07-12 ENCOUNTER — OFFICE VISIT (OUTPATIENT)
Dept: CARDIOLOGY CLINIC | Age: 72
End: 2022-07-12
Payer: MEDICARE

## 2022-07-12 VITALS
RESPIRATION RATE: 16 BRPM | BODY MASS INDEX: 25.98 KG/M2 | DIASTOLIC BLOOD PRESSURE: 76 MMHG | WEIGHT: 141.2 LBS | HEIGHT: 62 IN | SYSTOLIC BLOOD PRESSURE: 124 MMHG | HEART RATE: 76 BPM

## 2022-07-12 DIAGNOSIS — I48.0 PAROXYSMAL ATRIAL FIBRILLATION (HCC): Primary | ICD-10-CM

## 2022-07-12 DIAGNOSIS — I10 ESSENTIAL HYPERTENSION: ICD-10-CM

## 2022-07-12 DIAGNOSIS — E05.90 HYPERTHYROIDISM: ICD-10-CM

## 2022-07-12 PROCEDURE — 3017F COLORECTAL CA SCREEN DOC REV: CPT | Performed by: INTERNAL MEDICINE

## 2022-07-12 PROCEDURE — 1036F TOBACCO NON-USER: CPT | Performed by: INTERNAL MEDICINE

## 2022-07-12 PROCEDURE — 1090F PRES/ABSN URINE INCON ASSESS: CPT | Performed by: INTERNAL MEDICINE

## 2022-07-12 PROCEDURE — 93000 ELECTROCARDIOGRAM COMPLETE: CPT | Performed by: INTERNAL MEDICINE

## 2022-07-12 PROCEDURE — G8419 CALC BMI OUT NRM PARAM NOF/U: HCPCS | Performed by: INTERNAL MEDICINE

## 2022-07-12 PROCEDURE — 99214 OFFICE O/P EST MOD 30 MIN: CPT | Performed by: INTERNAL MEDICINE

## 2022-07-12 PROCEDURE — 1124F ACP DISCUSS-NO DSCNMKR DOCD: CPT | Performed by: INTERNAL MEDICINE

## 2022-07-12 PROCEDURE — G8400 PT W/DXA NO RESULTS DOC: HCPCS | Performed by: INTERNAL MEDICINE

## 2022-07-12 PROCEDURE — G8427 DOCREV CUR MEDS BY ELIG CLIN: HCPCS | Performed by: INTERNAL MEDICINE

## 2022-07-12 RX ORDER — MINOXIDIL 10 MG/1
10 TABLET ORAL DAILY
COMMUNITY

## 2022-07-12 NOTE — PROGRESS NOTES
OUTPATIENT CARDIOLOGY FOLLOW-UP    Name: Ras Victor    Age: 66 y.o. Primary Care Physician: Mykel Hu MD    Date of Service: 7/12/2022    Chief Complaint: Paroxysmal atrial fibrillation, hypertension, hyperthyroidism    Interim History: Since her most recent evaluation, the patient remains compensated from cardiovascular standpoint with limited palpitations predominantly related to episodes of anxiety. In the interim, her thyroid supplementation has been discontinued with the present reported symptoms of fatigue and a documented 20 pound weight gain with most recent laboratory assessment presently unavailable for review and by her report planned additional laboratory assessment later this month. In addition, due to her fatigue, her beta-blocker was discontinued without adverse effects with resumption of previously administered diltiazem without a return of constipation. She otherwise relates no additional active cardiovascular symptoms and is normotensive at the time of her present evaluation. She has noted no symptoms of a focal neurologic origin nor bleeding in the face of oral anticoagulation. She additionally tolerated recent noncardiac surgery with needs of urologic correction in the face of incontinence. Review of Systems: The remainder of a complete multisystem review including consitutional, central nervous, respiratory, circulatory, gastrointestinal, genitourinary, endocrinologic, hematologic, musculoskeletal and psychiatric are negative.     Past Medical History:  Past Medical History:   Diagnosis Date    Arthritis     HTN (hypertension)     PAF (paroxysmal atrial fibrillation) (HCC)     Thyroid disease     Urinary incontinence        Past Surgical History:  Past Surgical History:   Procedure Laterality Date    BLADDER SURGERY  1 month    COLONOSCOPY      HYSTERECTOMY, TOTAL ABDOMINAL (CERVIX REMOVED)      NERVE SURGERY N/A 6/24/2022    PERCUTANEOUS IMPLANT OF NEURO STIM ELECTRODES INCISION AND SUBCUTANEOUS PLACEMENT OF PERIPHERAL NEUROSTIM PULSE GENERATOR performed by Humphrey Contreras DO at [facility] OR    PAIN MANAGEMENT PROCEDURE N/A 4/15/2022    PERIPHERAL NERVE EVALUATION performed by Humphrey Cont[facility]ras DO at [facility] N/A 4/15/2022    EXCISION VAGINAL MESH performed by Humphrey Contreras DO at [facility] OR       Family History:  Family History   Problem Relation Age of Onset    Cancer Mother         breast    Heart Attack Mother     Diabetes Father     Alzheimer's Disease Father     Cancer Sister     No Known Problems Sister     Diabetes Brother        Social History:  Social History     Socioeconomic History    Marital status:      Spouse name: Not on file    Number of children: Not on file    Years of education: Not on file    Highest education level: Not on file   Occupational History    Not on file   Tobacco Use    Smoking status: Never Smoker    Smokeless tobacco: Never Used   Vaping Use    Vaping Use: Never used   Substance and Sexual Activity    Alcohol use: Yes     Comment: on social occa.  Drug use: Never    Sexual activity: Not Currently     Partners: Male   Other Topics Concern    Not on file   Social History Narrative    Drinks 1 cup of coffee daily. Social Determinants of Health     Financial Resource Strain:     Difficulty of Paying Living Expenses: Not on file   Food Insecurity:     Worried About Running Out of Food in the Last Year: Not on file    Marcelino of Food in the Last Year: Not on file   Transportation Needs:     Lack of Transportation (Medical): Not on file    Lack of Transportation (Non-Medical):  Not on file   Physical Activity:     Days of Exercise per Week: Not on file    Minutes of Exercise per Session: Not on file   Stress:     Feeling of Stress : Not on file   Social Connections:     Frequency of Communication with Friends and Family: Not on file    Frequency of Social Gatherings with Friends and Family: Not on file    Attends Caodaism Services: Not on file    Active Member of Clubs or Organizations: Not on file    Attends Club or Organization Meetings: Not on file    Marital Status: Not on file   Intimate Partner Violence:     Fear of Current or Ex-Partner: Not on file    Emotionally Abused: Not on file    Physically Abused: Not on file    Sexually Abused: Not on file   Housing Stability:     Unable to Pay for Housing in the Last Year: Not on file    Number of [place] in the Last Year: Not on file    Unstable Housing in the Last Year: Not on file       Allergies: Allergies   Allergen Reactions    Statins Other (See Comments)     Muscle aches       Current Medications:  Current Outpatient Medications   Medication Sig Dispense Refill    minoxidil (LONITEN) 10 MG tablet Take 10 mg by mouth daily      dilTIAZem (CARDIZEM CD) 180 MG extended release capsule Take 180 mg by mouth daily      apixaban (ELIQUIS) 5 MG TABS tablet Take 1 tablet by mouth 2 times daily 60 tablet 1    venlafaxine (EFFEXOR XR) 150 MG extended release capsule Take 150 mg by mouth daily      metoprolol succinate (TOPROL XL) 25 MG extended release tablet TAKE 1 TABLET BY MOUTH DAILY (Patient not taking: Reported on 7/12/2022) 90 tablet 3    mirabegron (MYRBETRIQ) 50 MG TB24 Take 50 mg by mouth daily (Patient not taking: Reported on 7/12/2022)      levothyroxine (SYNTHROID) 75 MCG tablet Take 1 tablet by mouth Daily (Patient not taking: Reported on 7/12/2022) 30 tablet 1    HYDROcodone-acetaminophen (NORCO) 7.5-325 MG per tablet Take 1 tablet by mouth every 6 hours as needed for Pain. (Patient not taking: Reported on 7/12/2022)      topiramate (TOPAMAX) 100 MG tablet Take 100 mg by mouth 2 times daily (Patient not taking: Reported on 7/12/2022)       No current facility-administered medications for this visit.          Physical Exam:  /76   Pulse 76   Resp 16   Ht 5' 2\" (1.575 m)   Wt 141 lb 3.2 oz (64 kg)   BMI evidence of sinus rhythm with low voltage within the limb leads, atrial enlargement, a rightward directed axis and delayed precordial transition zone      ASSESSMENT / PLAN: On a clinical basis, the patient presently appears compensated from a cardiovascular standpoint the face of her atrial arrhythmias and at present I have maintained her existing medical regimen. We will attempt to obtain most recent assessment of thyroid functions from your office with needs of ongoing careful monitoring of her thyroid status to assist in arrhythmia management. I encouraged appropriate lifestyle modification to achieve weight reduction to benefit diastolic cardiac performance along with that of aggressive risk factor modification of blood pressure and serum lipids in attempt to reduce risk of future atherosclerotic development. I presently plan her clinical reassessment approximately 1 year and if she remains stable with no further arrhythmia related issues and stable thyroid function, will subsequently return her to your care. I would happily reevaluate her in the interim should additional cardiovascular difficulties or concerns arise. The patient's current medication list, allergies, problem list and results of all previously ordered testing were reviewed at today's visit. Follow-up office visit in 1 year      Note: This report was completed using computerized voice recognition software. Every effort has been made to ensure accuracy, however; inadvertent computerized transcription errors may be present. Matt Chavis.  Jay Musa, [address] [place] [address] Cardiology    An electronic copy of this follow-up progress note was forwarded to Dr. Kiara Boyle

## 2022-09-28 RX ORDER — DILTIAZEM HYDROCHLORIDE 180 MG/1
CAPSULE, COATED, EXTENDED RELEASE ORAL
Qty: 180 CAPSULE | Refills: 3 | Status: SHIPPED | OUTPATIENT
Start: 2022-09-28

## 2022-12-13 ENCOUNTER — HOSPITAL ENCOUNTER (OUTPATIENT)
Age: 72
Discharge: HOME | End: 2022-12-15
Payer: MEDICARE

## 2022-12-13 ENCOUNTER — HOSPITAL ENCOUNTER (OUTPATIENT)
Dept: GENERAL RADIOLOGY | Age: 72
Discharge: HOME | End: 2022-12-15
Payer: MEDICARE

## 2022-12-13 DIAGNOSIS — M25.511 RIGHT SHOULDER PAIN, UNSPECIFIED CHRONICITY: ICD-10-CM

## 2022-12-13 PROCEDURE — 73030 X-RAY EXAM OF SHOULDER: CPT

## 2023-12-18 NOTE — PROCEDURES
[address] [facility],[address] [facility] and Vascular Lab - [address]. Crispin mora, [address] [place] [address]  [phone]                  Exercise Stress Nuclear Gated SPECT Study    Name: [facility] Account Number: [de-identified]    :  1950      Sex: female              Date of Study:  2021    Height: 5' 2\" (157.5 cm)  Weight: 119 lb (54 kg)     Ordering Provider: Wing Pretty MD          PCP: Jeremy Hastings MD      Cardiologist: Wing Pretty MD                        Interpreting Physician: Carlos Manuel Sterling MD  _________________________________________________________________________________    Indication:   Detecting the presence and location of coronary artery disease    Clinical History:   Patient has no known history of coronary artery disease. Resting ECG:    SR and is normal    Exercise: The patient exercised using a Handy protocol, completing 5:30 minutes and reaching an estimated work load of 7.0 metabolic equivalents (METS). Resting HR was 86. Peak exercise heart rate was 138 ( 93% of maximum predicted heart rate for age). Baseline /64. Peak exercise /50. The blood pressure response to exercise was hypertensive      Exercise was terminated due to dyspnea, heart rate attained and left side chest pain at peak exercise. The patient experienced left side chest pain, no radiation, dizziness and shortness of breath at peak exercise with exercise       Pulse oximetry was not done. Was unable to get a reading due to the patient wearing artificial nails. Exercise ECG:   The patient demonstrated occasional PAC's during exercise. With exercise, there were no ST segment changes of significance at the heart rate achieved. IMAGING: Myocardial perfusion imaging was performed at rest 30-35 minutes following the intravenous injection of 8.5 mCi of (Tc-tetrofosmin) followed by 10 ml of Normal Saline.   At peak exercise, the patient was injected intravenously with 27.5 mCi of (Tc-tetrofosmin) followed by 10 ml of Normal Saline. Gated post-stress tomographic imaging was performed 20-25 minutes after stress. FINDINGS: The overall quality of the study was good. Left ventricular cavity size was noted to be normal.    Rotational analog analysis demonstrated abnormal patient motion. The gated SPECT stress imaging in the short, vertical long, and horizontal long axis demonstrated normal homogeneous tracer distribution throughout the myocardium both on the post stress and resting images. Gated SPECT left ventricular ejection fraction was calculated to be 79%, with normal myocardial thickening and wall motion. Impression:    1. Exercise EKG was normal.  2. The patient experienced no chest pain with exercise. 3. The myocardial perfusion imaging was normal.    4. Overall left ventricular systolic function was normal without regional wall motion abnormalities. 5. Exercise capacity was below average. 6. Low risk general exercise nuclear stress test.    Thank you for sending your patient to this [facility] [employer].      Electronically signed by Kai Dobbins MD on 9/1/2021 at 12:27 PM PRINCIPAL PROCEDURE  Procedure: Laparoscopic cholecystectomy  Findings and Treatment:

## 2024-08-12 ENCOUNTER — APPOINTMENT (OUTPATIENT)
Dept: CT IMAGING | Age: 74
End: 2024-08-12
Payer: MEDICARE

## 2024-08-12 ENCOUNTER — HOSPITAL ENCOUNTER (EMERGENCY)
Age: 74
Discharge: HOME | End: 2024-08-12
Attending: EMERGENCY MEDICINE
Payer: MEDICARE

## 2024-08-12 VITALS
HEIGHT: 62 IN | TEMPERATURE: 97.7 F | BODY MASS INDEX: 23.37 KG/M2 | HEART RATE: 69 BPM | SYSTOLIC BLOOD PRESSURE: 141 MMHG | RESPIRATION RATE: 16 BRPM | WEIGHT: 127 LBS | OXYGEN SATURATION: 100 % | DIASTOLIC BLOOD PRESSURE: 60 MMHG

## 2024-08-12 DIAGNOSIS — N39.0 URINARY TRACT INFECTION WITHOUT HEMATURIA, SITE UNSPECIFIED: Primary | ICD-10-CM

## 2024-08-12 LAB
ANION GAP SERPL CALCULATED.3IONS-SCNC: 8 MMOL/L (ref 7–16)
BACTERIA URNS QL MICRO: ABNORMAL
BASOPHILS # BLD: 0.06 K/UL (ref 0–0.2)
BASOPHILS NFR BLD: 1 % (ref 0–2)
BILIRUB UR QL STRIP: NEGATIVE
BUN SERPL-MCNC: 10 MG/DL (ref 6–23)
CALCIUM SERPL-MCNC: 8.9 MG/DL (ref 8.6–10.2)
CHLORIDE SERPL-SCNC: 106 MMOL/L (ref 98–107)
CLARITY UR: ABNORMAL
CO2 SERPL-SCNC: 23 MMOL/L (ref 22–29)
COLOR UR: YELLOW
CREAT SERPL-MCNC: 0.9 MG/DL (ref 0.5–1)
CRYSTALS URNS MICRO: ABNORMAL /HPF
EOSINOPHIL # BLD: 0.08 K/UL (ref 0.05–0.5)
EOSINOPHILS RELATIVE PERCENT: 1 % (ref 0–6)
EPI CELLS #/AREA URNS HPF: ABNORMAL /HPF
ERYTHROCYTE [DISTWIDTH] IN BLOOD BY AUTOMATED COUNT: 14.4 % (ref 11.5–15)
GFR, ESTIMATED: 71 ML/MIN/1.73M2
GLUCOSE SERPL-MCNC: 93 MG/DL (ref 74–99)
GLUCOSE UR STRIP-MCNC: NEGATIVE MG/DL
HCT VFR BLD AUTO: 40.6 % (ref 34–48)
HGB BLD-MCNC: 13.1 G/DL (ref 11.5–15.5)
HGB UR QL STRIP.AUTO: ABNORMAL
IMM GRANULOCYTES # BLD AUTO: <0.03 K/UL (ref 0–0.58)
IMM GRANULOCYTES NFR BLD: 0 % (ref 0–5)
KETONES UR STRIP-MCNC: NEGATIVE MG/DL
LEUKOCYTE ESTERASE UR QL STRIP: ABNORMAL
LYMPHOCYTES NFR BLD: 1.14 K/UL (ref 1.5–4)
LYMPHOCYTES RELATIVE PERCENT: 19 % (ref 20–42)
MCH RBC QN AUTO: 27 PG (ref 26–35)
MCHC RBC AUTO-ENTMCNC: 32.3 G/DL (ref 32–34.5)
MCV RBC AUTO: 83.7 FL (ref 80–99.9)
MONOCYTES NFR BLD: 0.56 K/UL (ref 0.1–0.95)
MONOCYTES NFR BLD: 10 % (ref 2–12)
NEUTROPHILS NFR BLD: 69 % (ref 43–80)
NEUTS SEG NFR BLD: 4.05 K/UL (ref 1.8–7.3)
NITRITE UR QL STRIP: NEGATIVE
PH UR STRIP: 5.5 [PH] (ref 5–9)
PLATELET # BLD AUTO: 261 K/UL (ref 130–450)
PMV BLD AUTO: 10.7 FL (ref 7–12)
POTASSIUM SERPL-SCNC: 4.3 MMOL/L (ref 3.5–5)
PROT UR STRIP-MCNC: NEGATIVE MG/DL
RBC # BLD AUTO: 4.85 M/UL (ref 3.5–5.5)
RBC #/AREA URNS HPF: ABNORMAL /HPF
SODIUM SERPL-SCNC: 137 MMOL/L (ref 132–146)
SP GR UR STRIP: >1.03 (ref 1–1.03)
UROBILINOGEN UR STRIP-ACNC: 0.2 EU/DL (ref 0–1)
WBC #/AREA URNS HPF: ABNORMAL /HPF
WBC OTHER # BLD: 5.9 K/UL (ref 4.5–11.5)

## 2024-08-12 PROCEDURE — 80048 BASIC METABOLIC PNL TOTAL CA: CPT

## 2024-08-12 PROCEDURE — 6360000004 HC RX CONTRAST MEDICATION: Performed by: RADIOLOGY

## 2024-08-12 PROCEDURE — 85025 COMPLETE CBC W/AUTO DIFF WBC: CPT

## 2024-08-12 PROCEDURE — 87077 CULTURE AEROBIC IDENTIFY: CPT

## 2024-08-12 PROCEDURE — 81001 URINALYSIS AUTO W/SCOPE: CPT

## 2024-08-12 PROCEDURE — 74177 CT ABD & PELVIS W/CONTRAST: CPT

## 2024-08-12 PROCEDURE — 51798 US URINE CAPACITY MEASURE: CPT

## 2024-08-12 PROCEDURE — 96360 HYDRATION IV INFUSION INIT: CPT

## 2024-08-12 PROCEDURE — 87086 URINE CULTURE/COLONY COUNT: CPT

## 2024-08-12 PROCEDURE — 99285 EMERGENCY DEPT VISIT HI MDM: CPT

## 2024-08-12 PROCEDURE — 2580000003 HC RX 258: Performed by: EMERGENCY MEDICINE

## 2024-08-12 PROCEDURE — 6370000000 HC RX 637 (ALT 250 FOR IP): Performed by: EMERGENCY MEDICINE

## 2024-08-12 RX ORDER — CEFDINIR 300 MG/1
300 CAPSULE ORAL 2 TIMES DAILY
Qty: 14 CAPSULE | Refills: 0 | Status: SHIPPED | OUTPATIENT
Start: 2024-08-12 | End: 2024-08-19

## 2024-08-12 RX ORDER — OXYCODONE HYDROCHLORIDE AND ACETAMINOPHEN 5; 325 MG/1; MG/1
1 TABLET ORAL ONCE
Status: COMPLETED | OUTPATIENT
Start: 2024-08-12 | End: 2024-08-12

## 2024-08-12 RX ORDER — 0.9 % SODIUM CHLORIDE 0.9 %
1000 INTRAVENOUS SOLUTION INTRAVENOUS ONCE
Status: COMPLETED | OUTPATIENT
Start: 2024-08-12 | End: 2024-08-12

## 2024-08-12 RX ORDER — CEFDINIR 300 MG/1
300 CAPSULE ORAL ONCE
Status: COMPLETED | OUTPATIENT
Start: 2024-08-12 | End: 2024-08-12

## 2024-08-12 RX ADMIN — CEFDINIR 300 MG: 300 CAPSULE ORAL at 14:16

## 2024-08-12 RX ADMIN — OXYCODONE HYDROCHLORIDE AND ACETAMINOPHEN 1 TABLET: 5; 325 TABLET ORAL at 12:18

## 2024-08-12 RX ADMIN — SODIUM CHLORIDE 1000 ML: 9 INJECTION, SOLUTION INTRAVENOUS at 14:15

## 2024-08-12 RX ADMIN — IOPAMIDOL 75 ML: 755 INJECTION, SOLUTION INTRAVENOUS at 13:28

## 2024-08-12 ASSESSMENT — PAIN - FUNCTIONAL ASSESSMENT
PAIN_FUNCTIONAL_ASSESSMENT: 0-10
PAIN_FUNCTIONAL_ASSESSMENT: NONE - DENIES PAIN

## 2024-08-12 ASSESSMENT — LIFESTYLE VARIABLES
HOW OFTEN DO YOU HAVE A DRINK CONTAINING ALCOHOL: NEVER
HOW MANY STANDARD DRINKS CONTAINING ALCOHOL DO YOU HAVE ON A TYPICAL DAY: PATIENT DOES NOT DRINK

## 2024-08-12 ASSESSMENT — PAIN SCALES - GENERAL
PAINLEVEL_OUTOF10: 10
PAINLEVEL_OUTOF10: 10

## 2024-08-12 ASSESSMENT — PAIN DESCRIPTION - ORIENTATION: ORIENTATION: MID;LOWER

## 2024-08-12 ASSESSMENT — PAIN DESCRIPTION - DESCRIPTORS: DESCRIPTORS: PRESSURE

## 2024-08-12 ASSESSMENT — PAIN DESCRIPTION - LOCATION: LOCATION: ABDOMEN;PELVIS

## 2024-08-12 NOTE — ED PROVIDER NOTES
PELVIS W IV CONTRAST Additional Contrast? None   Final Result   There is no evidence of acute abdominal/pelvic abnormality.           No results found.    No results found.    PROCEDURES   Unless otherwise noted below, none        PAST MEDICAL HISTORY/Chronic Conditions Affecting Care      has a past medical history of Arthritis, HTN (hypertension), PAF (paroxysmal atrial fibrillation) (HCC), Thyroid disease, and Urinary incontinence.     EMERGENCY DEPARTMENT COURSE    Vitals:    Vitals:    08/12/24 1058 08/12/24 1606   BP: (!) 145/82 (!) 141/60   Pulse: 76 69   Resp: 16 16   Temp: 98.4 °F (36.9 °C) 97.7 °F (36.5 °C)   TempSrc: Oral Oral   SpO2: 100% 100%   Weight: 57.6 kg (127 lb)    Height: 1.575 m (5' 2\")        Patient was given the following medications:  Medications   oxyCODONE-acetaminophen (PERCOCET) 5-325 MG per tablet 1 tablet (1 tablet Oral Given 8/12/24 1218)   iopamidol (ISOVUE-370) 76 % injection 75 mL (75 mLs IntraVENous Given 8/12/24 1328)   sodium chloride 0.9 % bolus 1,000 mL (0 mLs IntraVENous Stopped 8/12/24 1519)   cefdinir (OMNICEF) capsule 300 mg (300 mg Oral Given 8/12/24 1416)         Medical Decision Making/Differential Diagnosis:    CC/HPI Summary, Social Determinants of health, Records Reviewed, DDx, testing done/not done, ED Course, Reassessment, disposition considerations/shared decision making with patient, consults, disposition:      ED Course as of 08/12/24 2054   Mon Aug 12, 2024   1357 Leukocyte Esterase, Urine(!): MODERATE [MB]   1357 WBC, UA(!): 21 TO 50 [MB]   1357 RBC, UA(!): 6 TO 9 [MB]   1357 Crystals, UA(!): 2+ URIC ACID [MB]   1444 Reevaluated, will call her urologist and her PCP tomorrow for further follow-up.  Encouraged to stop Bactrim and start Omnicef. [MB]      ED Course User Index  [MB] Alem Arshad, DO        Medical Decision Making  Amount and/or Complexity of Data Reviewed  Labs: ordered. Decision-making details documented in ED Course.  Radiology:

## 2024-08-12 NOTE — DISCHARGE INSTR - COC
Continuity of Care Form    Patient Name: Serene Hayden   :  1950  MRN:  [id number]    Admit date:  2024  Discharge date:  ***    Code Status Order: Prior   Advance Directives:   Advance Care Flowsheet Documentation             Admitting Physician:  No admitting provider for patient encounter.  PCP: Judy Salvador DO    Discharging Nurse: ***  Discharging Hospital Unit/Room#: SGWR/SG-WR  Discharging Unit Phone Number: ***    Emergency Contact:   Extended Emergency Contact Information  Primary Emergency Contact: Clarence Hayden  Address: [address] [place] [address]  Home Phone: [phone]  Relation: Spouse  Secondary Emergency Contact: Patricia Carrera           [place], [place] [address] [place] [address]  Home Phone: [phone]  Mobile Phone: [phone]  Relation: Child    Past Surgical History:  Past Surgical History:   Procedure Laterality Date    BLADDER SURGERY  1 month    COLONOSCOPY      HYSTERECTOMY, TOTAL ABDOMINAL (CERVIX REMOVED)      NERVE SURGERY N/A 2022    PERCUTANEOUS IMPLANT OF NEURO STIM ELECTRODES INCISION AND SUBCUTANEOUS PLACEMENT OF PERIPHERAL NEUROSTIM PULSE GENERATOR performed by Dav Contreras DO at [facility] OR    PAIN MANAGEMENT PROCEDURE N/A 4/15/2022    PERIPHERAL NERVE EVALUATION performed by Dav Contreras DO at [facility] OR    URETHRAL SURGERY N/A 4/15/2022    EXCISION VAGINAL MESH performed by Dav Contreras DO at [facility] OR       Immunization History:   Immunization History   Administered Date(s) Administered    COVID-19, PFIZER PURPLE top, DILUTE for use, (age 12 y+), 30mcg/0.3mL 2021, 2021    Influenza Virus Vaccine 2012, 2016, 2020    Influenza, FLUARIX, FLULAVAL, FLUZONE (age 6 mo+) AND AFLURIA, (age 3 y+), PF, 0.5mL 2017    Pneumococcal, PPSV23, PNEUMOVAX 23, (age 2y+), SC/IM, 0.5mL 2020    TDaP, ADACEL (age 10y-64y), BOOSTRIX (age 10y+), IM, 0.5mL 08/15/2018    Zoster Recombinant

## 2024-08-15 LAB
MICROORGANISM SPEC CULT: ABNORMAL
SERVICE CMNT-IMP: ABNORMAL
SPECIMEN DESCRIPTION: ABNORMAL

## 2025-02-14 ENCOUNTER — TELEPHONE (OUTPATIENT)
Dept: CARDIOLOGY CLINIC | Age: 75
End: 2025-02-14

## 2025-02-14 NOTE — TELEPHONE ENCOUNTER
Patient came in office she is transferring to Dr. Knapp at [facility]       Appointment 2-20-25    Records faxed to that office at patients request    [phone]

## 2025-03-19 ENCOUNTER — HOSPITAL ENCOUNTER (OUTPATIENT)
Age: 75
Discharge: HOME | End: 2025-03-21

## 2025-03-28 LAB — SURGICAL PATHOLOGY REPORT: NORMAL

## 2025-04-08 LAB — SURGICAL PATHOLOGY REPORT: NORMAL

## 2025-04-23 ENCOUNTER — HOSPITAL ENCOUNTER (OUTPATIENT)
Age: 75
Discharge: HOME | End: 2025-04-25

## 2025-04-24 ENCOUNTER — HOSPITAL ENCOUNTER (OUTPATIENT)
Age: 75
Discharge: HOME | End: 2025-04-26

## 2025-04-24 LAB
ANION GAP SERPL CALCULATED.3IONS-SCNC: 10 MMOL/L (ref 7–16)
BUN SERPL-MCNC: 17 MG/DL (ref 6–23)
CALCIUM SERPL-MCNC: 8.6 MG/DL (ref 8.6–10.2)
CHLORIDE SERPL-SCNC: 105 MMOL/L (ref 98–107)
CO2 SERPL-SCNC: 24 MMOL/L (ref 22–29)
CREAT SERPL-MCNC: 0.8 MG/DL (ref 0.5–1)
ERYTHROCYTE [DISTWIDTH] IN BLOOD BY AUTOMATED COUNT: 13.5 % (ref 11.5–15)
GFR, ESTIMATED: 75 ML/MIN/1.73M2
GLUCOSE SERPL-MCNC: 105 MG/DL (ref 74–99)
HCT VFR BLD AUTO: 33.8 % (ref 34–48)
HGB BLD-MCNC: 10.6 G/DL (ref 11.5–15.5)
MCH RBC QN AUTO: 26.7 PG (ref 26–35)
MCHC RBC AUTO-ENTMCNC: 31.4 G/DL (ref 32–34.5)
MCV RBC AUTO: 85.1 FL (ref 80–99.9)
PLATELET # BLD AUTO: 229 K/UL (ref 130–450)
PMV BLD AUTO: 11.7 FL (ref 7–12)
POTASSIUM SERPL-SCNC: 4.6 MMOL/L (ref 3.5–5)
RBC # BLD AUTO: 3.97 M/UL (ref 3.5–5.5)
SODIUM SERPL-SCNC: 139 MMOL/L (ref 132–146)
WBC OTHER # BLD: 9.6 K/UL (ref 4.5–11.5)

## 2025-04-24 PROCEDURE — 80048 BASIC METABOLIC PNL TOTAL CA: CPT

## 2025-04-24 PROCEDURE — 85027 COMPLETE CBC AUTOMATED: CPT

## 2025-05-01 LAB — SURGICAL PATHOLOGY REPORT: NORMAL

## 2025-07-10 LAB — SURGICAL PATHOLOGY REPORT: NORMAL

## (undated) DEVICE — MARKER,SKIN,WI/RULER AND LABELS: Brand: MEDLINE

## (undated) DEVICE — INSTRUMENT HANDLE KNIFE #3 REUSABLE

## (undated) DEVICE — CLAMP TONSIL

## (undated) DEVICE — C-ARM: Brand: UNBRANDED

## (undated) DEVICE — SET CYSTOSCOPE 21FR

## (undated) DEVICE — DOUBLE BASIN SET: Brand: MEDLINE INDUSTRIES, INC.

## (undated) DEVICE — PACK PROCEDURE SURG GEN CUST

## (undated) DEVICE — 4-PORT MANIFOLD: Brand: NEPTUNE 2

## (undated) DEVICE — PROGRAMMER SMART COMM W/HANDSET F/SACRAL NRVE STIMULATORS

## (undated) DEVICE — PAD,SANITARY,11 IN,MAXI,N-STRL,IND WRAP: Brand: MEDLINE

## (undated) DEVICE — NEEDLE HYPO 22GA L1.5IN BLK POLYPR HUB S STL REG BVL STR

## (undated) DEVICE — ADHESIVE SKIN CLSR 0.7ML TOP DERMBND ADV

## (undated) DEVICE — GLOVE ORANGE PI 7 1/2   MSG9075

## (undated) DEVICE — TUBING SUCT 12FR MAL ALUM SHFT FN CAP VENT UNIV CONN W/ OBT

## (undated) DEVICE — DECANTER: Brand: UNBRANDED

## (undated) DEVICE — SET INSTR BABY LAP

## (undated) DEVICE — CLAMP SURG L5IN BKHAUS TWL

## (undated) DEVICE — DRESSING COMP W4XL4IN N ADH PD W2.5XL2.5IN GZ BORDERED ADH

## (undated) DEVICE — GOWN,SIRUS,FABRNF,XL,20/CS: Brand: MEDLINE

## (undated) DEVICE — APPLICATOR MEDICATED 26 CC SOLUTION HI LT ORNG CHLORAPREP

## (undated) DEVICE — CONTROL SYRINGE LUER-LOCK TIP: Brand: MONOJECT

## (undated) DEVICE — INTENDED FOR TISSUE SEPARATION, AND OTHER PROCEDURES THAT REQUIRE A SHARP SURGICAL BLADE TO PUNCTURE OR CUT.: Brand: BARD-PARKER ® STAINLESS STEEL BLADES

## (undated) DEVICE — TAPE ADH W3INXL10YD WHT COT WVN BK POWERFUL RUB BASE HIGHLY

## (undated) DEVICE — CLAMP GALLBLADDER

## (undated) DEVICE — APPLICATOR PREP 26ML 0.7% IOD POVACRYLEX 74% ISO ALC ST

## (undated) DEVICE — CATHETER F BLLN 5CC 16FR 2 W HYDRGEL COAT LESS TRAUM LUB

## (undated) DEVICE — NEUROSTIMULATOR EXT SM LTWT SGL BTTN H2O RESIST WIRELESS

## (undated) DEVICE — COVER HNDL LT DISP

## (undated) DEVICE — CATHETER F BLLN 5CC 18FR 2 W HYDRGEL COAT LESS TRAUM LUB

## (undated) DEVICE — NEEDLE HYPO 25GA L1.5IN BLU POLYPR HUB S STL REG BVL STR

## (undated) DEVICE — BANDAGE,GAUZE,BULKEE II,4.5"X4.1YD,STRL: Brand: MEDLINE

## (undated) DEVICE — GLOVE ORANGE PI 7   MSG9070

## (undated) DEVICE — ELECTRODE PT RET AD L9FT HI MOIST COND ADH HYDRGEL CORDED

## (undated) DEVICE — Device

## (undated) DEVICE — PACK,AURORA,LAVH: Brand: MEDLINE

## (undated) DEVICE — GLOVE SURG SZ 75 CRM LTX FREE POLYISOPRENE POLYMER BEAD ANTI

## (undated) DEVICE — GRADUATE

## (undated) DEVICE — DRAPE,REIN 53X77,STERILE: Brand: MEDLINE

## (undated) DEVICE — SWABSTICK MEDICATED L4IN BENZ TINC SKIN PREP APLICARE

## (undated) DEVICE — DRAPE,LAPAROTOMY,PCH,STERILE: Brand: MEDLINE

## (undated) DEVICE — Y-TYPE TUR/BLADDER IRRIGATION SET, REGULATING CLAMP

## (undated) DEVICE — RAZOR SURG PREP PERSONNA NONSTER FIXED

## (undated) DEVICE — ABSORBENT, WATERPROOF, BACTERIA PROOF FILM DRESSING: Brand: OPSITE POST OP 15.5X8.5CM CTN 20

## (undated) DEVICE — CAMERA STRYKER 1488

## (undated) DEVICE — GLASS MEDICINE ST

## (undated) DEVICE — STRIP,CLOSURE,WOUND,MEDI-STRIP,1/2X4: Brand: MEDLINE

## (undated) DEVICE — TAPE,WATERPROOF,CURAD,2"X10YD,LF,72/CS: Brand: CURAD

## (undated) DEVICE — TRAY,VAG PREP,2PR VNYL GLV,4 C: Brand: MEDLINE INDUSTRIES, INC.

## (undated) DEVICE — SOLUTION IV 1000ML 0.9% SOD CHL PH 5 INJ USP VIAFLX PLAS

## (undated) DEVICE — DRAPE C ARM W41XL74IN UNIV MOB W RUBBERBAND CLP

## (undated) DEVICE — SPECULUM VAGINAL WEIGHTED W/ HOLES

## (undated) DEVICE — JELLY,LUBE,STERILE,FLIP TOP,TUBE,2-OZ: Brand: MEDLINE